# Patient Record
Sex: FEMALE | Race: WHITE | NOT HISPANIC OR LATINO
[De-identification: names, ages, dates, MRNs, and addresses within clinical notes are randomized per-mention and may not be internally consistent; named-entity substitution may affect disease eponyms.]

---

## 2019-05-02 ENCOUNTER — TRANSCRIPTION ENCOUNTER (OUTPATIENT)
Age: 49
End: 2019-05-02

## 2019-05-13 ENCOUNTER — TRANSCRIPTION ENCOUNTER (OUTPATIENT)
Age: 49
End: 2019-05-13

## 2019-07-19 ENCOUNTER — TRANSCRIPTION ENCOUNTER (OUTPATIENT)
Age: 49
End: 2019-07-19

## 2019-10-01 ENCOUNTER — TRANSCRIPTION ENCOUNTER (OUTPATIENT)
Age: 49
End: 2019-10-01

## 2019-10-17 ENCOUNTER — TRANSCRIPTION ENCOUNTER (OUTPATIENT)
Age: 49
End: 2019-10-17

## 2020-02-10 ENCOUNTER — TRANSCRIPTION ENCOUNTER (OUTPATIENT)
Age: 50
End: 2020-02-10

## 2021-10-26 ENCOUNTER — TRANSCRIPTION ENCOUNTER (OUTPATIENT)
Age: 51
End: 2021-10-26

## 2022-03-22 ENCOUNTER — NON-APPOINTMENT (OUTPATIENT)
Age: 52
End: 2022-03-22

## 2022-03-22 ENCOUNTER — APPOINTMENT (OUTPATIENT)
Dept: FAMILY MEDICINE | Facility: CLINIC | Age: 52
End: 2022-03-22
Payer: MEDICAID

## 2022-03-22 VITALS
BODY MASS INDEX: 26.66 KG/M2 | HEIGHT: 69 IN | RESPIRATION RATE: 14 BRPM | OXYGEN SATURATION: 98 % | DIASTOLIC BLOOD PRESSURE: 88 MMHG | HEART RATE: 63 BPM | WEIGHT: 180 LBS | SYSTOLIC BLOOD PRESSURE: 120 MMHG

## 2022-03-22 DIAGNOSIS — Z80.0 FAMILY HISTORY OF MALIGNANT NEOPLASM OF DIGESTIVE ORGANS: ICD-10-CM

## 2022-03-22 DIAGNOSIS — Z13.1 ENCOUNTER FOR SCREENING FOR DIABETES MELLITUS: ICD-10-CM

## 2022-03-22 DIAGNOSIS — Z83.71 FAMILY HISTORY OF COLONIC POLYPS: ICD-10-CM

## 2022-03-22 DIAGNOSIS — Z78.9 OTHER SPECIFIED HEALTH STATUS: ICD-10-CM

## 2022-03-22 DIAGNOSIS — Z13.220 ENCOUNTER FOR SCREENING FOR LIPOID DISORDERS: ICD-10-CM

## 2022-03-22 DIAGNOSIS — E61.1 IRON DEFICIENCY: ICD-10-CM

## 2022-03-22 DIAGNOSIS — D64.9 ANEMIA, UNSPECIFIED: ICD-10-CM

## 2022-03-22 DIAGNOSIS — Z13.29 ENCOUNTER FOR SCREENING FOR OTHER SUSPECTED ENDOCRINE DISORDER: ICD-10-CM

## 2022-03-22 DIAGNOSIS — Z76.89 PERSONS ENCOUNTERING HEALTH SERVICES IN OTHER SPECIFIED CIRCUMSTANCES: ICD-10-CM

## 2022-03-22 DIAGNOSIS — Z83.3 FAMILY HISTORY OF DIABETES MELLITUS: ICD-10-CM

## 2022-03-22 DIAGNOSIS — D12.6 BENIGN NEOPLASM OF COLON, UNSPECIFIED: ICD-10-CM

## 2022-03-22 DIAGNOSIS — R06.83 SNORING: ICD-10-CM

## 2022-03-22 DIAGNOSIS — H81.10 BENIGN PAROXYSMAL VERTIGO, UNSPECIFIED EAR: ICD-10-CM

## 2022-03-22 DIAGNOSIS — G47.9 SLEEP DISORDER, UNSPECIFIED: ICD-10-CM

## 2022-03-22 PROCEDURE — G0444 DEPRESSION SCREEN ANNUAL: CPT | Mod: 59

## 2022-03-22 PROCEDURE — 99205 OFFICE O/P NEW HI 60 MIN: CPT | Mod: 25

## 2022-03-22 PROCEDURE — 93000 ELECTROCARDIOGRAM COMPLETE: CPT | Mod: 59

## 2022-03-22 RX ORDER — ALBUTEROL SULFATE 90 UG/1
108 (90 BASE) INHALANT RESPIRATORY (INHALATION)
Qty: 8 | Refills: 0 | Status: DISCONTINUED | COMMUNITY
Start: 2021-12-11

## 2022-03-22 RX ORDER — CLINDAMYCIN HYDROCHLORIDE 150 MG/1
150 CAPSULE ORAL
Qty: 21 | Refills: 0 | Status: DISCONTINUED | COMMUNITY
Start: 2021-11-30

## 2022-03-22 RX ORDER — AZITHROMYCIN 250 MG/1
250 TABLET, FILM COATED ORAL
Qty: 6 | Refills: 0 | Status: DISCONTINUED | COMMUNITY
Start: 2021-12-11

## 2022-03-22 RX ORDER — METHYLPREDNISOLONE 4 MG/1
4 TABLET ORAL
Qty: 21 | Refills: 0 | Status: DISCONTINUED | COMMUNITY
Start: 2021-10-29

## 2022-03-22 RX ORDER — MONTELUKAST 10 MG/1
10 TABLET, FILM COATED ORAL
Qty: 30 | Refills: 0 | Status: DISCONTINUED | COMMUNITY
Start: 2021-10-08

## 2022-03-22 RX ORDER — TRAMADOL HYDROCHLORIDE 50 MG/1
50 TABLET, COATED ORAL
Qty: 9 | Refills: 0 | Status: DISCONTINUED | COMMUNITY
Start: 2021-11-30

## 2022-03-22 NOTE — PLAN
[FreeTextEntry1] : \par establish care\par \par Headaches worsening and more frequent and waking her out of sleep\par Neurologist consult\par Headache Diary\par tylenol prn \par MRI/MRA \par Increase fluids to 80 oz water per day\par If worst headache of life occurs, advised to go to ED, pt understood and agreed w/plan \par \par anxiety/depression \par phq9= 8 \par snoring and daytime sleepiness r/o sleep apnea\par sleep study\par MIKE 12\par Denies suicidal or homicidal ideations\par start lexapro 5mg po daily x 1 week and then increase to 10mg po daily \par -EKG- SINUS BRADYCARDIA @ 56   BPM, NORMAL AXIS, NORMAL ND/QT INTERVAL, NO ST/ T WAVE ABNORMALITIES NOTED\par EKG reviewed\par \par snoring, daytime sleepiness and moving in sleep\par sleep study \par \par possible bppv\par advised ent consult for possible epley maneuver\par \par hx anemia\par cbc iron studies and b12/folate\par \par Hx familial polyposis of colon \par advised to return to GI\par \par f/u for cpe\par given script for labs\par pt agreed w/plan \par

## 2022-03-22 NOTE — HISTORY OF PRESENT ILLNESS
[FreeTextEntry1] : patient presents for establish care and c/o migraines and sleeping issues and anxiety/depression and \par c/o dizziness when moving head side to side or looking down  [de-identified] : patient presents for establish care and c/o migraines and sleeping issues and anxiety/depression and \par c/o dizziness when moving head side to side or looking down \par \par she said "my head feels like it is going to explode" and "i had to sit in the dark."\par If she goes to [place] to visit family she has no headache but when she comes home it occurs.\par she said she is around her sisters dogs\par +headaches waking her from sleep\par she has difficulty focusing\par +blurry vision at times denies double vision\par sometimes headache is in frontal area and back of head\par she said her head feels numbness\par she has had mris in the past, last mri brain was 3-4 yrs ago\par headaches seem worse since aug-sept\par it is the same pain but stronger \par headaches are more frequent than before\par c/o  headache   pain,    4/10 constant  , alleviated by  tylenol and cbd and marijuana and goes away for 24hrs after marijuana use , aggravated by  light and sound \par \par she said she is snoring and she will have a sleep test \par \par last colonoscopy 2002\par \par Denies suicidal or homicidal ideations\par \par c/o dizziness when moving head side to side or looking down \par

## 2022-03-22 NOTE — PHYSICAL EXAM
[No Lymphadenopathy] : no lymphadenopathy [Supple] : supple [Normal] : normal rate, regular rhythm, normal S1 and S2 and no murmur heard [No Edema] : there was no peripheral edema [No Rash] : no rash [No Focal Deficits] : no focal deficits [Normal Affect] : the affect was normal [Normal Mood] : the mood was normal [Normal Insight/Judgement] : insight and judgment were intact [de-identified] : dizziness mild with extraocular movements  [de-identified] : d [de-identified] : mild dizziness with head movements lateral and flexion  [de-identified] : no calf tenderness b/l LE [de-identified] : CN 2-12 INTACT, NORMAL STRENGTH UPPER AND LOWER EXT B/L 5/5, NORMAL RAPID ALTERNATING MOVEMENTS AND FINGER TO NOSE

## 2022-03-22 NOTE — HEALTH RISK ASSESSMENT
[1] : 1) Little interest or pleasure doing things for several days (1) [2] : 2) Feeling down, depressed, or hopeless for more than half of the days (2) [PHQ-2 Positive] : PHQ-2 Positive [1/2 of Days or More (2)] : 2.) Feeling down, depressed or hopeless? Half the days or more [Nearly Every Day (3)] : 3.) Trouble falling asleep, or sleeping too much? Nearly every day [Several Days (1)] : 6.) Feeling bad about yourself, or that you are a failure, or have let yourself or your family down? Several days [Not at All (0)] : 8.) Moving or speaking so slowly that other people could have noticed, or the opposite, moving or speaking faster than usual? Not at all [Mild] : severity of depression is mild [Not at all] : How difficult have these problems made it for you to do your work, take care of things at home, or get along with people? Not at all [PHQ-9 Positive] : PHQ-9 Positive [[id number]] : 3 [[id number]] : 8

## 2022-03-22 NOTE — REVIEW OF SYSTEMS
[Headache] : headache [Dizziness] : dizziness [Anxiety] : anxiety [Depression] : depression [de-identified] : +sleeping issues

## 2022-04-05 ENCOUNTER — APPOINTMENT (OUTPATIENT)
Dept: FAMILY MEDICINE | Facility: CLINIC | Age: 52
End: 2022-04-05

## 2022-04-06 ENCOUNTER — APPOINTMENT (OUTPATIENT)
Dept: FAMILY MEDICINE | Facility: CLINIC | Age: 52
End: 2022-04-06
Payer: MEDICAID

## 2022-04-06 PROCEDURE — 99441: CPT

## 2022-04-06 RX ORDER — ESCITALOPRAM OXALATE 10 MG/1
10 TABLET ORAL
Qty: 30 | Refills: 2 | Status: DISCONTINUED | COMMUNITY
Start: 2022-03-28 | End: 2022-04-06

## 2022-04-21 LAB — HBA1C MFR BLD HPLC: 6.5

## 2022-05-09 ENCOUNTER — APPOINTMENT (OUTPATIENT)
Dept: FAMILY MEDICINE | Facility: CLINIC | Age: 52
End: 2022-05-09
Payer: MEDICAID

## 2022-05-09 VITALS
DIASTOLIC BLOOD PRESSURE: 80 MMHG | BODY MASS INDEX: 25.48 KG/M2 | WEIGHT: 172 LBS | SYSTOLIC BLOOD PRESSURE: 107 MMHG | OXYGEN SATURATION: 97 % | HEART RATE: 59 BPM | HEIGHT: 69 IN | RESPIRATION RATE: 14 BRPM

## 2022-05-09 VITALS — TEMPERATURE: 97 F

## 2022-05-09 DIAGNOSIS — M79.89 OTHER SPECIFIED SOFT TISSUE DISORDERS: ICD-10-CM

## 2022-05-09 PROCEDURE — 99214 OFFICE O/P EST MOD 30 MIN: CPT

## 2022-05-09 NOTE — HISTORY OF PRESENT ILLNESS
[FreeTextEntry8] : patient c/o pain on right leg, + swollen,+ red, +burning sensation X 3 days \par \par Was recently diagnosed with diabetes and started on metformin, she increased dosage last week and now notices that her entire right leg is swollen, beat red, hot, and painful Saturday morning. The redness has resolved after icing her leg but feels it is still swollen and very painful.

## 2022-05-09 NOTE — PHYSICAL EXAM
[No Acute Distress] : no acute distress [Well Nourished] : well nourished [Well Developed] : well developed [Well-Appearing] : well-appearing [Normal Sclera/Conjunctiva] : normal sclera/conjunctiva [PERRL] : pupils equal round and reactive to light [EOMI] : extraocular movements intact [Normal Outer Ear/Nose] : the outer ears and nose were normal in appearance [Normal Oropharynx] : the oropharynx was normal [No JVD] : no jugular venous distention [No Lymphadenopathy] : no lymphadenopathy [Supple] : supple [Thyroid Normal, No Nodules] : the thyroid was normal and there were no nodules present [No Respiratory Distress] : no respiratory distress  [No Accessory Muscle Use] : no accessory muscle use [Clear to Auscultation] : lungs were clear to auscultation bilaterally [Normal Rate] : normal rate  [Regular Rhythm] : with a regular rhythm [Normal S1, S2] : normal S1 and S2 [No Murmur] : no murmur heard [No Carotid Bruits] : no carotid bruits [No Abdominal Bruit] : a ~M bruit was not heard ~T in the abdomen [No Varicosities] : no varicosities [Pedal Pulses Present] : the pedal pulses are present [No Edema] : there was no peripheral edema [No Palpable Aorta] : no palpable aorta [No Extremity Clubbing/Cyanosis] : no extremity clubbing/cyanosis [Soft] : abdomen soft [Non Tender] : non-tender [Non-distended] : non-distended [No Masses] : no abdominal mass palpated [No HSM] : no HSM [Normal Bowel Sounds] : normal bowel sounds [Normal Posterior Cervical Nodes] : no posterior cervical lymphadenopathy [Normal Anterior Cervical Nodes] : no anterior cervical lymphadenopathy [No CVA Tenderness] : no CVA  tenderness [No Spinal Tenderness] : no spinal tenderness [No Rash] : no rash [Coordination Grossly Intact] : coordination grossly intact [No Focal Deficits] : no focal deficits [Normal Gait] : normal gait [Deep Tendon Reflexes (DTR)] : deep tendon reflexes were 2+ and symmetric [Normal Affect] : the affect was normal [Normal Insight/Judgement] : insight and judgment were intact [de-identified] : RLA swelling

## 2022-05-09 NOTE — PLAN
[FreeTextEntry1] : RLE swelling with pain\par will send for doppler of LE r.o DVT\par likely bite \par redness has resolved still with pain and swelling\par will call with result

## 2022-05-11 ENCOUNTER — APPOINTMENT (OUTPATIENT)
Dept: FAMILY MEDICINE | Facility: CLINIC | Age: 52
End: 2022-05-11
Payer: MEDICAID

## 2022-05-11 VITALS
BODY MASS INDEX: 25.48 KG/M2 | WEIGHT: 172 LBS | OXYGEN SATURATION: 98 % | DIASTOLIC BLOOD PRESSURE: 80 MMHG | HEART RATE: 66 BPM | HEIGHT: 69 IN | SYSTOLIC BLOOD PRESSURE: 120 MMHG | RESPIRATION RATE: 12 BRPM

## 2022-05-11 VITALS — TEMPERATURE: 97.3 F

## 2022-05-11 DIAGNOSIS — Z00.00 ENCOUNTER FOR GENERAL ADULT MEDICAL EXAMINATION W/OUT ABNORMAL FINDINGS: ICD-10-CM

## 2022-05-11 PROCEDURE — 99214 OFFICE O/P EST MOD 30 MIN: CPT

## 2022-05-11 RX ORDER — FLUOXETINE HYDROCHLORIDE 20 MG/1
20 CAPSULE ORAL
Qty: 30 | Refills: 1 | Status: DISCONTINUED | COMMUNITY
Start: 2022-04-06 | End: 2022-05-11

## 2022-05-11 RX ORDER — METFORMIN ER 500 MG 500 MG/1
500 TABLET ORAL
Qty: 360 | Refills: 1 | Status: DISCONTINUED | COMMUNITY
Start: 2022-04-21 | End: 2022-05-11

## 2022-05-11 NOTE — PHYSICAL EXAM
[No Lymphadenopathy] : no lymphadenopathy [Supple] : supple [Normal] : normal rate, regular rhythm, normal S1 and S2 and no murmur heard [No Edema] : there was no peripheral edema [Soft] : abdomen soft [Non Tender] : non-tender [Non-distended] : non-distended [No HSM] : no HSM [No Rash] : no rash [No Focal Deficits] : no focal deficits [Normal Affect] : the affect was normal [Normal Mood] : the mood was normal [Normal Insight/Judgement] : insight and judgment were intact [de-identified] : no calf tenderness b/l LE [de-identified] : ostomy in place with tape around it, no surrounding erythema noted but hard to asses due to tape

## 2022-05-11 NOTE — HISTORY OF PRESENT ILLNESS
[FreeTextEntry1] : pt presents for med for depression/ anxiety follow up  [de-identified] : 51yo F presents for anxiety/depression and DM II and needs ostomy supplies and heavy menses \par \par she stopped the fluoxetine for 2 weeks and had stomach upset so she had to stop it\par she felt relaxed but she had nausea\par she still feels anxious\par denies hx seizures or eating disorders\par she did not like lexapro either\par \par she started metformin april 21st\par a1c 6.5 \par \par she was drinking 7 glasses of milk per night\par she cut back to 2 glasses\par she cut pasta back to once per week\par \par she used to have heavy menses and then she said it was becoming lighter in february and now she has heavy periods again but she ddint have a perido in march and april \par denies no period for 12 mos straight\par she didn’t see obgyn as of yet\par she will see obgyn for a sonogram\par she will have upper egd friday w/ Dr [facility] \par she saw neurologist Dr Zapata and put her on medrol dose pack without relief\par she has been eating healthier\par she hasn't repeated blood work as of yet\par

## 2022-05-11 NOTE — REVIEW OF SYSTEMS
[Abdominal Pain] : no abdominal pain [Nausea] : no nausea [Vomiting] : no vomiting [Anxiety] : anxiety [Depression] : depression

## 2022-05-13 ENCOUNTER — RESULT REVIEW (OUTPATIENT)
Age: 52
End: 2022-05-13

## 2022-05-17 ENCOUNTER — NON-APPOINTMENT (OUTPATIENT)
Age: 52
End: 2022-05-17

## 2022-05-17 ENCOUNTER — APPOINTMENT (OUTPATIENT)
Dept: FAMILY MEDICINE | Facility: CLINIC | Age: 52
End: 2022-05-17
Payer: MEDICAID

## 2022-05-17 VITALS
RESPIRATION RATE: 12 BRPM | BODY MASS INDEX: 25.48 KG/M2 | DIASTOLIC BLOOD PRESSURE: 80 MMHG | OXYGEN SATURATION: 98 % | HEART RATE: 70 BPM | WEIGHT: 172 LBS | SYSTOLIC BLOOD PRESSURE: 118 MMHG | HEIGHT: 69 IN

## 2022-05-17 VITALS — TEMPERATURE: 97.1 F

## 2022-05-17 DIAGNOSIS — R55 SYNCOPE AND COLLAPSE: ICD-10-CM

## 2022-05-17 PROCEDURE — 99214 OFFICE O/P EST MOD 30 MIN: CPT | Mod: 25

## 2022-05-17 PROCEDURE — 93000 ELECTROCARDIOGRAM COMPLETE: CPT

## 2022-05-17 NOTE — HISTORY OF PRESENT ILLNESS
[FreeTextEntry1] : pt presents for med follow up \par pt c/o nausea +dizzy x 4/14/22 [de-identified] : 51yo F presents for recent syncope\par she denies palpitations prior to syncope\par she said her last meal was lunch and then at 2am she woke up to go to the bathroom to urinate \par she said she was dizzy and nauseous with the medication saturday\par  she got up to go to the bathroom and then she felt dizzy and sweating, she urinated and when she got up she felt dizzy and sweaty and vomited a little but not much , denies blood \par her fiance helped her into the room \par she said it was hot that day \par she had a syncopal episode for 1 minute and her fiance brought her down to the floor and then put her in the bed so she didn’t actually fall\par she took the pill sunday and monday and now she is tolerating the medication\par

## 2022-05-17 NOTE — REVIEW OF SYSTEMS
[Fever] : no fever [Chills] : no chills [Chest Pain] : no chest pain [Shortness Of Breath] : no shortness of breath [Dizziness] : dizziness [Fainting] : fainting

## 2022-05-17 NOTE — PHYSICAL EXAM
[No Lymphadenopathy] : no lymphadenopathy [Supple] : supple [Normal] : normal rate, regular rhythm, normal S1 and S2 and no murmur heard [No Edema] : there was no peripheral edema [Normal Posterior Cervical Nodes] : no posterior cervical lymphadenopathy [Normal Anterior Cervical Nodes] : no anterior cervical lymphadenopathy [No Rash] : no rash [No Focal Deficits] : no focal deficits [Normal Affect] : the affect was normal [Normal Mood] : the mood was normal [Normal Insight/Judgement] : insight and judgment were intact [de-identified] : no calf tenderness b/l LE [de-identified] : CN 2-12 INTACT, NORMAL STRENGTH UPPER AND LOWER EXT B/L 5/5, NORMAL RAPID ALTERNATING MOVEMENTS AND FINGER TO NOSE

## 2022-05-17 NOTE — PLAN
[FreeTextEntry1] : \par \par syncope-  vasovagal? hypoglycemia? nausea due to snri causing vasovagal? dehydration? normal neuro exam\par -EKG- NSR @  66 BPM, NORMAL AXIS, NORMAL ME/QT INTERVAL, NO ST/ T WAVE CHANGES NOTED\par EKG reviewed\par cardio consult\par if no findings may need neurologist if it happens again \par advised to stay hydrated\par advised to check bgms if feeling this way and if 70 or lower check bgms, sent kit to pharmacy , and advised to drink a juice box and check again in 15mins and if low again drink another juice and call 911 or have someone take her to the ED \par advised to eat 6 small meals or 3 big meals with snacks and advised she cannot skip meals\par advised she needs to take metformin with meals and not on an empty stomach\par she is now tolerating the effexor so will leave as is\par advised if she has another syncopal episode she will need to see a neurologist as well \par \par f/u 2  weeks or sooner if worsening  pt agreed w/plan

## 2022-05-20 RX ORDER — METFORMIN ER 500 MG 500 MG/1
500 TABLET ORAL
Qty: 180 | Refills: 0 | Status: DISCONTINUED | COMMUNITY
Start: 2022-05-11 | End: 2022-05-20

## 2022-06-10 ENCOUNTER — RX RENEWAL (OUTPATIENT)
Age: 52
End: 2022-06-10

## 2022-07-08 ENCOUNTER — RX RENEWAL (OUTPATIENT)
Age: 52
End: 2022-07-08

## 2022-08-12 ENCOUNTER — NON-APPOINTMENT (OUTPATIENT)
Age: 52
End: 2022-08-12

## 2022-08-15 ENCOUNTER — RX RENEWAL (OUTPATIENT)
Age: 52
End: 2022-08-15

## 2022-08-23 ENCOUNTER — APPOINTMENT (OUTPATIENT)
Dept: FAMILY MEDICINE | Facility: CLINIC | Age: 52
End: 2022-08-23

## 2022-08-23 VITALS
WEIGHT: 179 LBS | SYSTOLIC BLOOD PRESSURE: 130 MMHG | BODY MASS INDEX: 26.51 KG/M2 | HEIGHT: 69 IN | RESPIRATION RATE: 14 BRPM | DIASTOLIC BLOOD PRESSURE: 94 MMHG | OXYGEN SATURATION: 98 % | HEART RATE: 68 BPM

## 2022-08-23 VITALS — DIASTOLIC BLOOD PRESSURE: 80 MMHG | SYSTOLIC BLOOD PRESSURE: 124 MMHG

## 2022-08-23 DIAGNOSIS — R79.9 ABNORMAL FINDING OF BLOOD CHEMISTRY, UNSPECIFIED: ICD-10-CM

## 2022-08-23 PROCEDURE — 99214 OFFICE O/P EST MOD 30 MIN: CPT

## 2022-08-23 NOTE — PHYSICAL EXAM
[No Lymphadenopathy] : no lymphadenopathy [Supple] : supple [No Edema] : there was no peripheral edema [Normal] : soft, non-tender, non-distended, no masses palpated, no HSM and normal bowel sounds [No Rash] : no rash [No Focal Deficits] : no focal deficits [Normal Affect] : the affect was normal [Normal Mood] : the mood was normal [Normal Insight/Judgement] : insight and judgment were intact [de-identified] : no calf tenderness b/l LE [de-identified] : ostomy bag in place , scars on abdomen noted

## 2022-08-23 NOTE — PLAN
[FreeTextEntry1] : \par DM II improved \par advised to see ophthalmologist and podiatrist\par a1c 6.5-->6.0\par continue januvia 50mg po daily\par \par HLD\par will start crestor 5mg po qhs if repeat bili is normal , was slightly elevated 1.4 will repeat 2-3 weeks, denies abd pain /n/v \par will start co q 10 100-200mgpo daily at that time as well \par if muscle pain occurs stop crestor and call me\par recheck lfts in 1month and then in 3mos\par \par anxiety/depression, has room for improvement\par increase venlafaxine ER from 37.5mg to 75mg po qhs, she takes at night\par \par she said she will find out if she needs script for ostomy supplies changed\par \par f/u 3mos or sooner if issues arise\par advised if patient doesn’t hear from me 1 week after testing to call office for results , patient agreed w/plan and understood.\par

## 2022-08-23 NOTE — HISTORY OF PRESENT ILLNESS
[FreeTextEntry1] : Patient presents for medication check [de-identified] : 51yo F presents for f/u for anxiety/depression and DM II\par \par Denies chest pain, palpitations, shortness of breath, muscle pain, abd pain/n/v ,  LE edema\par \par she is grieving the loss of a family member right now\par

## 2022-08-23 NOTE — REVIEW OF SYSTEMS
[Fever] : no fever [Chills] : no chills [Chest Pain] : no chest pain [Palpitations] : no palpitations [Shortness Of Breath] : no shortness of breath [Abdominal Pain] : no abdominal pain [Nausea] : no nausea [Vomiting] : no vomiting

## 2022-09-15 ENCOUNTER — NON-APPOINTMENT (OUTPATIENT)
Age: 52
End: 2022-09-15

## 2022-09-15 ENCOUNTER — APPOINTMENT (OUTPATIENT)
Dept: FAMILY MEDICINE | Facility: CLINIC | Age: 52
End: 2022-09-15

## 2022-09-15 VITALS
HEIGHT: 69 IN | RESPIRATION RATE: 14 BRPM | TEMPERATURE: 97.2 F | SYSTOLIC BLOOD PRESSURE: 110 MMHG | BODY MASS INDEX: 26.36 KG/M2 | DIASTOLIC BLOOD PRESSURE: 60 MMHG | WEIGHT: 178 LBS | OXYGEN SATURATION: 98 % | HEART RATE: 68 BPM

## 2022-09-15 PROCEDURE — 93000 ELECTROCARDIOGRAM COMPLETE: CPT

## 2022-09-15 PROCEDURE — 99214 OFFICE O/P EST MOD 30 MIN: CPT | Mod: 25

## 2022-09-15 NOTE — PHYSICAL EXAM
[No Lymphadenopathy] : no lymphadenopathy [Supple] : supple [No Edema] : there was no peripheral edema [Normal] : soft, non-tender, non-distended, no masses palpated, no HSM and normal bowel sounds [No Rash] : no rash [No Focal Deficits] : no focal deficits [Normal Affect] : the affect was normal [Normal Mood] : the mood was normal [Normal Insight/Judgement] : insight and judgment were intact [de-identified] : no calf tenderness b/l LE [de-identified] : no guarding or rigidity, +ostomy bag in place and not distended  [de-identified] : skin lesion on right face looks with a cauliflower appearance  like a wart

## 2022-09-15 NOTE — REVIEW OF SYSTEMS
[Shortness Of Breath] : shortness of breath [Headache] : headache [Fever] : no fever [Chills] : no chills [Chest Pain] : no chest pain [Palpitations] : no palpitations [Abdominal Pain] : no abdominal pain [Nausea] : no nausea [Diarrhea] : diarrhea [Vomiting] : no vomiting [Melena] : no melena

## 2022-09-15 NOTE — PLAN
[FreeTextEntry1] : \par \par HLD\par will start crestor 5mg po qhs if repeat bili is normal , was slightly elevated 1.4 will repeat 2-3 weeks, denies abd pain /n/v \par will start co q 10 100-200mgpo daily at that time as well \par recheck lfts in 1month and then in 3mos once crestor started \par \par anxiety/depression, improved \par continue venlafaxine ER  75mg po qhs, she takes at night\par \par sob on exertion \par cardio consult advised \par -EKG- NSR @  62  BPM, NORMAL AXIS, NORMAL MA/QT INTERVAL, NO ST/ T WAVE CHANGES NOTED\par EKG reviewed\par \par diarrhea/ vomiting/ fatigue r/o covid, most likely viral gastroenteritis which resolved \par covid pcr today\par increase fluids\par cmp to ensure normal electrolytes/renal/ recheck bili\par \par labs to be done at lab \par \par f/u 3mos or sooner if issues arise\par advised if patient doesn’t hear from me 1 week after testing to call office for results , patient agreed w/plan and understood.\par \par

## 2022-09-15 NOTE — HISTORY OF PRESENT ILLNESS
[FreeTextEntry8] : Patient presents with possible stomach bug last Thursday. She was in [place], went out to dinner and later that night was vomiting non stop. Her sister in-law called ambulance ands she had blood work done and blood sugar checked. She still feels a little weak and has a headache. \par \par last thursday she had diarrhea in her bag and vomiting and so she went via ambulance to the hospital \par she also had really bad leg cramps and was told she was dehydrated. \par She was given anti nausea meds and electrolytes for leg cramps and then she was discharged.\par BGM was 257 when the ambulance came but she didn’t take her meds b/c she was vomiting. \par she got home sunday from [place] sunday night.\par She has been taking it easy, drinking lots of water, feels tired and run down\par she is grieving her nephews death\par \par she was having a nasal drip since sunday\par +sob with climbing stairs but this is not new\par denies chest pain, palpitations \par she hasn’t seen a cardiologist recently, last time was 3mos ago and she did not have the sob then\par \par denies fevers,chills, body aches, cough, nasal congestion, sore throat, sob, chest pain, loss of taste or smell\par \par denies diarrhea or vomiting now\par so she is feeling better\par \par she feels mild fatigue and had a headache this morning and it resolved w/ tylenol\par denies vision changes or new headaches , headaches waking her from sleep or increased frequency or severity of headaches\par

## 2022-09-16 LAB — SARS-COV-2 N GENE NPH QL NAA+PROBE: NOT DETECTED

## 2022-10-24 ENCOUNTER — RX RENEWAL (OUTPATIENT)
Age: 52
End: 2022-10-24

## 2022-12-23 ENCOUNTER — APPOINTMENT (OUTPATIENT)
Dept: FAMILY MEDICINE | Facility: CLINIC | Age: 52
End: 2022-12-23

## 2022-12-27 ENCOUNTER — APPOINTMENT (OUTPATIENT)
Dept: FAMILY MEDICINE | Facility: CLINIC | Age: 52
End: 2022-12-27

## 2022-12-29 ENCOUNTER — APPOINTMENT (OUTPATIENT)
Dept: FAMILY MEDICINE | Facility: CLINIC | Age: 52
End: 2022-12-29

## 2022-12-29 VITALS
SYSTOLIC BLOOD PRESSURE: 124 MMHG | DIASTOLIC BLOOD PRESSURE: 80 MMHG | BODY MASS INDEX: 23.7 KG/M2 | OXYGEN SATURATION: 98 % | WEIGHT: 160 LBS | HEART RATE: 69 BPM | HEIGHT: 69 IN | RESPIRATION RATE: 12 BRPM

## 2022-12-29 VITALS — TEMPERATURE: 97.9 F

## 2022-12-29 PROCEDURE — 99214 OFFICE O/P EST MOD 30 MIN: CPT

## 2023-01-06 ENCOUNTER — APPOINTMENT (OUTPATIENT)
Dept: ORTHOPEDIC SURGERY | Facility: CLINIC | Age: 53
End: 2023-01-06
Payer: OTHER MISCELLANEOUS

## 2023-01-06 DIAGNOSIS — M76.72 PERONEAL TENDINITIS, LEFT LEG: ICD-10-CM

## 2023-01-06 DIAGNOSIS — G62.9 POLYNEUROPATHY, UNSPECIFIED: ICD-10-CM

## 2023-01-06 PROCEDURE — 99072 ADDL SUPL MATRL&STAF TM PHE: CPT

## 2023-01-06 PROCEDURE — 99213 OFFICE O/P EST LOW 20 MIN: CPT

## 2023-01-06 PROCEDURE — 73610 X-RAY EXAM OF ANKLE: CPT | Mod: LT

## 2023-01-06 NOTE — HISTORY OF PRESENT ILLNESS
[Work related] : work related [Sudden] : sudden [Household chores] : household chores [Leisure] : leisure [Work] : work [Social interactions] : social interactions [Rest] : rest [10] : 10 [9] : 9 [Burning] : burning [Dull/Aching] : dull/aching [Throbbing] : throbbing [Tingling] : tingling [Constant] : constant [Disabled] : Work status: disabled [de-identified] : Patient is here for her left ankle. Patient had left ankle arthroscopy with extensive debridement and lateral ligament reconstruction on 4/2017 and 5/24/2019. Patient states NNI. Patient states she has had pain since 11/2022. Patient states she has numbness and tingling in the toes (foot is not on WC case) patient states she has burning and numbness in the ankle along the incision on the lateral aspect of the ankle. Patient denies PMHx of back conditions. Patient reports that 4/21/22 she was diagnosed with DM. [] : Post Surgical Visit: no [FreeTextEntry1] : Left ankle  [FreeTextEntry3] : 10/6/2016 [FreeTextEntry5] : The cause of the injury was due to loading\par food/beverages on cart, slipped and fell [de-identified] : Movement

## 2023-01-06 NOTE — PHYSICAL EXAM
[NL (40)] : plantar flexion 40 degrees [NL 30)] : inversion 30 degrees [NL (20)] : eversion 20 degrees [5___] : [facility] 5[unfilled]/5 [2+] : posterior tibialis pulse: 2+ [Decreased] : saphenous nerve sensation decreased [Left] : left ankle [There are no fractures, subluxations or dislocations. No significant abnormalities are seen] : There are no fractures, subluxations or dislocations. No significant abnormalities are seen [] : no sign of infection [FreeTextEntry3] : small ecchymosis over ATFL region

## 2023-01-06 NOTE — ASSESSMENT
[FreeTextEntry1] : 53 yo female presenting with left peroneal tendinitis, neuropathy with paresthesias radiating into her left ankle and foot. X-rays negative for fractures/abnormalities\par -Advised that patient should perform HEP \par -Activities as tolerated\par -Rest, ice, compression, elevation, NSAIDs PRN for pain. \par -All questions answered\par -F/u PRN

## 2023-01-09 NOTE — PHYSICAL EXAM
[Coordination Grossly Intact] : coordination grossly intact [No Focal Deficits] : no focal deficits [Normal Gait] : normal gait [Deep Tendon Reflexes (DTR)] : deep tendon reflexes were 2+ and symmetric [Normal] : normal gait, coordination grossly intact, no focal deficits and deep tendon reflexes were 2+ and symmetric [de-identified] : left lower back pain w radiating pain down sciatic  nerve.

## 2023-01-09 NOTE — HISTORY OF PRESENT ILLNESS
[FreeTextEntry8] : pt c/o sciatica pain +trouble standing and sleeping x thanksgiving   It started in her low back and then went to her left buttocks and down her leg. She has a history of ankle issues on left and elbow injury left. she doesn’t exercise. she tried tylenol and chiropractor  ( Correction chiropractor in [place]. x 4 weeks. \par pt presents for med check  she is diabetic  and has anxiety\par

## 2023-01-09 NOTE — ASSESSMENT
[FreeTextEntry1] : pt with over 6 w  of pain to her lower back and sciatica. she has been to chiropractic and does physical directed exercises for ove 6 weeks. she tried nsaids, muscle relaxor. the back is a little better but the sciatica is not. \par try muscle relaxor , meloxicam. if no better will add gabapentin. \par check mr lumbar ro hernaited disk\par We spent sufficient time to discuss aspects of care; questions were answered  to patient's satisfaction.The diagnosis and care plan were discussed with patient in detail.  Patient test results were  reviewed and explained in full. All questions and concerns  were answered to the best of my knowledge.\par

## 2023-01-11 ENCOUNTER — APPOINTMENT (OUTPATIENT)
Dept: FAMILY MEDICINE | Facility: CLINIC | Age: 53
End: 2023-01-11
Payer: MEDICAID

## 2023-01-11 VITALS
RESPIRATION RATE: 14 BRPM | OXYGEN SATURATION: 98 % | DIASTOLIC BLOOD PRESSURE: 90 MMHG | WEIGHT: 160 LBS | HEART RATE: 95 BPM | SYSTOLIC BLOOD PRESSURE: 130 MMHG | HEIGHT: 69 IN | BODY MASS INDEX: 23.7 KG/M2

## 2023-01-11 VITALS — TEMPERATURE: 96 F

## 2023-01-11 DIAGNOSIS — K29.00 ACUTE GASTRITIS W/OUT BLEEDING: ICD-10-CM

## 2023-01-11 DIAGNOSIS — R10.819 ABDOMINAL TENDERNESS, UNSPECIFIED SITE: ICD-10-CM

## 2023-01-11 DIAGNOSIS — T88.7XXA UNSPECIFIED ADVERSE EFFECT OF DRUG OR MEDICAMENT, INITIAL ENCOUNTER: ICD-10-CM

## 2023-01-11 DIAGNOSIS — R31.9 HEMATURIA, UNSPECIFIED: ICD-10-CM

## 2023-01-11 LAB
BILIRUB UR QL STRIP: NORMAL
GLUCOSE UR-MCNC: NEGATIVE
HCG UR QL: 0.2 EU/DL
HGB UR QL STRIP.AUTO: NORMAL
KETONES UR-MCNC: NORMAL
LEUKOCYTE ESTERASE UR QL STRIP: NORMAL
NITRITE UR QL STRIP: NEGATIVE
PH UR STRIP: 6.5
PROT UR STRIP-MCNC: NORMAL
SP GR UR STRIP: 1.03

## 2023-01-11 PROCEDURE — 81003 URINALYSIS AUTO W/O SCOPE: CPT | Mod: QW

## 2023-01-11 PROCEDURE — 99214 OFFICE O/P EST MOD 30 MIN: CPT | Mod: 25

## 2023-01-11 RX ORDER — MELOXICAM 15 MG/1
15 TABLET ORAL
Qty: 30 | Refills: 3 | Status: DISCONTINUED | COMMUNITY
Start: 2022-12-29 | End: 2023-01-11

## 2023-01-11 NOTE — HISTORY OF PRESENT ILLNESS
[FreeTextEntry8] : Patient states since saturday she's been having heartburn and pain in the back in the kidney area that she hasn't been able to sleep\par Patient states shes been short of breath, loss of appetite, and exhausted from the pain\par Patient had an MRI of the lumbar spine last night at [facility]\par \par 52 female female, unknown to mx, hx of ileostomy, gastritis who presents today concerned for worsened heartburn symptoms. \par Says it started this past friday. \par Was previously seen by NP in office, diagnosed with sciatica, started on high strength meloxicam. \par Says it is a persistent burning pain. She tried multiple otc antacids w/o relief. \par She tried her sisters protonix and obtained relief from this. \par \par Also with complaint of left back pain that she was not sure was related to lumbar herniated disc or kidney. \par Had MRI done, results not yet available. \par No dysuria or hematuria noted by patient.

## 2023-01-11 NOTE — REVIEW OF SYSTEMS
[Abdominal Pain] : abdominal pain [Heartburn] : heartburn [Back Pain] : back pain [Negative] : Neurological [Nausea] : no nausea [Constipation] : no constipation [Diarrhea] : diarrhea [Vomiting] : no vomiting [Melena] : no melena

## 2023-01-11 NOTE — ASSESSMENT
[FreeTextEntry1] : Gastritis \par reviewed patient's endoscopy report from 3/2022\par noted to have gastritis at that time. \par this chronic condition is suspected now to be in exacerbation due to medication side effect from recent high strength nsaid use. \par advised stopping meloxicam. \par will treat with protonix 40mg qd \par advised if symptoms fail to improve, will need to follow back up with GI\par \par Left Flank/CV Tenderness\par complicated by hematuria noted in UA collected in office today\par unable to exclude pyelonephritis at this time\par checking urine culture and cytology\par CT Abd pelvis ordered to eval for pyelo\par will treat empirically for uti with cipro 250mg bid x 7 days \par advised to seek care if symptoms worsen or if having fevers, chills, n/v. \par \par f/u if no improvement\par labs to be done at outside lab, will notify patient of results when available and received.\par Patient agrees with plan, all further questions answered during encounter.\par

## 2023-01-11 NOTE — PHYSICAL EXAM
[No Lymphadenopathy] : no lymphadenopathy [Normal] : no respiratory distress, lungs were clear to auscultation bilaterally and no accessory muscle use [Normal Rate] : normal rate  [Regular Rhythm] : with a regular rhythm [Normal S1, S2] : normal S1 and S2 [Soft] : abdomen soft [Non-distended] : non-distended [No Spinal Tenderness] : no spinal tenderness [Grossly Normal Strength/Tone] : grossly normal strength/tone [Coordination Grossly Intact] : coordination grossly intact [No Focal Deficits] : no focal deficits [Normal Gait] : normal gait [de-identified] : epigastric tenderness w/o rebound or guarding. ileostomy bag in place.  [de-identified] : left equivocal CVA tenderness.

## 2023-01-16 DIAGNOSIS — M51.27 OTHER INTERVERTEBRAL DISC DISPLACEMENT, LUMBOSACRAL REGION: ICD-10-CM

## 2023-01-16 LAB
BACTERIA UR CULT: NORMAL
URINE CYTOLOGY: NORMAL

## 2023-01-27 ENCOUNTER — APPOINTMENT (OUTPATIENT)
Dept: FAMILY MEDICINE | Facility: CLINIC | Age: 53
End: 2023-01-27
Payer: MEDICAID

## 2023-01-27 DIAGNOSIS — R11.0 NAUSEA: ICD-10-CM

## 2023-01-27 PROCEDURE — 99202 OFFICE O/P NEW SF 15 MIN: CPT | Mod: 95

## 2023-01-27 NOTE — PLAN
[FreeTextEntry1] : Nausea/ Loose stools diarrhea\par will check for cdiff as recently on cipro\par start zofran 4mg as needed\par increase fluids and electrolyte consumption\par

## 2023-01-27 NOTE — HISTORY OF PRESENT ILLNESS
[FreeTextEntry8] : PResenting via telehealth with complaints of very liquid stool from ileostomy rather then her normal consistency. She has not been on any new medications, but has been feeling very nauseous and felt out of breath, light headed and dizzy. She has not been out in the public but is still wondering if she has a simple bug going around. She feels very tired and run down with no energy. She has lost 45 pounds in the last 4 months, she was trying to lose weight but now is much lower then she expected, her stools started becoming very loose, she was recently on antibiotic cipro for possible kidney infection for 7 days, she completed antibiotic over 2 weeks ago. She denies any blood in the stool, mucous in the stool. \par \par She eats fruits and vegetables, does not eat much eats small breakfast lunch and dinner. She does not eat fast food or anything.

## 2023-02-03 ENCOUNTER — NON-APPOINTMENT (OUTPATIENT)
Age: 53
End: 2023-02-03

## 2023-02-16 ENCOUNTER — APPOINTMENT (OUTPATIENT)
Dept: NEUROSURGERY | Facility: CLINIC | Age: 53
End: 2023-02-16
Payer: MEDICAID

## 2023-02-16 VITALS
BODY MASS INDEX: 22.22 KG/M2 | DIASTOLIC BLOOD PRESSURE: 87 MMHG | WEIGHT: 150 LBS | HEART RATE: 79 BPM | SYSTOLIC BLOOD PRESSURE: 128 MMHG | OXYGEN SATURATION: 99 % | HEIGHT: 69 IN | TEMPERATURE: 96.4 F

## 2023-02-16 PROCEDURE — 99205 OFFICE O/P NEW HI 60 MIN: CPT

## 2023-02-17 NOTE — CONSULT LETTER
[Dear  ___] : Dear  [unfilled], [Courtesy Letter:] : I had the pleasure of seeing your patient, [unfilled], in my office today. [Sincerely,] : Sincerely, [FreeTextEntry2] : Dai Su MD\par [address],\par [place] [address]\par  [FreeTextEntry1] : Mrs. Shell is a very pleasant 52-year-old female patient who was seen in our office today in regards to left-sided leg and back pain.\par \par The patient endorses a sudden onset severe radiating left-sided leg pain starting approximately 2 months ago.  The patient does not recall any specific inciting event or trauma leading up to this problem.  At the time of onset, the patient had severe pain and numbness radiating down the left side of her leg which has since resolved significantly.  The patient states that her leg has not had any pain or numbness over the last month.  The patient currently still experiences severe pain in the low back radiating into the left left buttock.  The patient's pain is constant and present even with recumbency.  The patient has attempted exercise therapy without significant relief.  The patient attempted 5 visits with a chiropractor without significant relief.  The patient denies any new bowel or bladder symptoms.  The patient denies any weakness.\par \par The patient's past medical history is significant for diabetes, depression, and anxiety.  The patient endorses allergies to aspirin, penicillin, and metformin.  These medications cause nausea and vomiting as well as pruritus.  The patient's current medical regimen includes Januvia, venlafaxine, pantoprazole, and Tylenol.  The patient has a significant GI history with a recurrent tumor in the abdomen and multiple polyps requiring surgery.  The patient has an ostomy at this time.\par \par On examination, the patient is alert, oriented, and compliant with the exam.  The patient demonstrates full strength in the upper and lower extremities bilaterally.  The patient does not endorse point tenderness in the lumbar spine.  The patient demonstrates 2+ reflexes in the lower extremities bilaterally.  The patient has difficulty transitioning from a seated to standing position secondary to pain.  The patient ambulates with an antalgic gait.\par \par The patient is accompanied with an MRI scan of the lumbar spine dated January 10, 2023.  These images demonstrate a fairly sizable disc herniation at L5/S1 causing nerve root compression on the left. The patient also has a CT scan of the abdomen and pelvis performed on January 19, 2023 which demonstrates acute pancreatitis and pneumobilia.  The patient also has an ostomy.  The L5/S1 disc can be noted on these images.\par \par Taken together, the patient has a clinical history and radiographic findings most consistent with a lumbar radiculopathy that is partially resolved.  Although the majority of the patient's radiating leg pain has resolved, the patient continues to experience severe pain approximately in the buttock.  At this time, I have explained to the patient that she is a candidate for surgical intervention in the form of a microdiscectomy depending on her pain.  The patient has been informed that this is a quality-of-life procedure.  The potential risks and benefits of the procedure were explained in detail to the patient and the patient would like to return home to discuss her options with her friends and family.  Other conservative therapies were also discussed with the patient including physical therapy, massage therapy, medications, and injection therapy. [FreeTextEntry3] : Bud Irizarry MD, PhD, CS, FAANS Attending Neurosurgeon  of Neurosurgery [facility] at [facility] at [address], [place] [address] Office: [phone] Fax: [phone]

## 2023-03-02 ENCOUNTER — APPOINTMENT (OUTPATIENT)
Dept: FAMILY MEDICINE | Facility: CLINIC | Age: 53
End: 2023-03-02
Payer: MEDICAID

## 2023-03-02 VITALS
RESPIRATION RATE: 12 BRPM | BODY MASS INDEX: 22.66 KG/M2 | SYSTOLIC BLOOD PRESSURE: 120 MMHG | HEART RATE: 59 BPM | WEIGHT: 153 LBS | OXYGEN SATURATION: 98 % | HEIGHT: 69 IN | DIASTOLIC BLOOD PRESSURE: 80 MMHG

## 2023-03-02 VITALS — TEMPERATURE: 95.9 F

## 2023-03-02 DIAGNOSIS — R63.4 ABNORMAL WEIGHT LOSS: ICD-10-CM

## 2023-03-02 DIAGNOSIS — M54.30 SCIATICA, UNSPECIFIED SIDE: ICD-10-CM

## 2023-03-02 DIAGNOSIS — R19.7 DIARRHEA, UNSPECIFIED: ICD-10-CM

## 2023-03-02 DIAGNOSIS — R51.9 HEADACHE, UNSPECIFIED: ICD-10-CM

## 2023-03-02 PROCEDURE — 99214 OFFICE O/P EST MOD 30 MIN: CPT

## 2023-03-02 RX ORDER — CYCLOBENZAPRINE HYDROCHLORIDE 10 MG/1
10 TABLET, FILM COATED ORAL
Qty: 30 | Refills: 0 | Status: COMPLETED | COMMUNITY
Start: 2022-12-29 | End: 2023-03-02

## 2023-03-02 RX ORDER — ONDANSETRON 4 MG/1
4 TABLET ORAL EVERY 8 HOURS
Qty: 48 | Refills: 0 | Status: COMPLETED | COMMUNITY
Start: 2023-01-27 | End: 2023-03-02

## 2023-03-02 RX ORDER — CIPROFLOXACIN HYDROCHLORIDE 250 MG/1
250 TABLET, FILM COATED ORAL
Qty: 14 | Refills: 0 | Status: COMPLETED | COMMUNITY
Start: 2023-01-11 | End: 2023-03-02

## 2023-03-02 NOTE — ASSESSMENT
[FreeTextEntry1] : 52 yo wf here for follow \par hx pancreatitis\par reports 40 pound weight loss\par  \par follow up gastro \par she also reports headaches. follow up neuro   \par \par she reports depression .Increase fenlafaxine to 150 mg\par \par Labs to be drawn/ specimens obtained  at outside  lab    for evaluation of   assessed conditions - cbc cmp lipid    hgba1c for physical and screening purposes.\par  Normal

## 2023-03-02 NOTE — HISTORY OF PRESENT ILLNESS
[FreeTextEntry1] : pt presents for follow up \par pt presents for mirage follow up \par pt c/o loss of apatite x 1 month  [de-identified] : 52 yo wf here for follow up diabetes. she is dealing for migraines.\par over the course of 4 m she lost 40 pounds  she is losing too much. she has no appetite. she is not a big eater but now she picks like a bird. she lost  her nephew last august it is very hard on her  she could not see him for 5 y before\par her fiancee lost his alejandro he knew since 8 yo he had pancreatic and colon ca. I am trying to move and I am trying to save. i cant work bc I have the elbow issue\par I also went to see Dr Yin for my spine  I am going back this thursday to review the options. He said the wall of the disc broke and he has to repair the wall and the options are pain mgt or surgery.

## 2023-03-02 NOTE — REVIEW OF SYSTEMS
[Recent Change In Weight] : ~T recent weight change [Joint Pain] : joint pain [Joint Stiffness] : joint stiffness [Joint Swelling] : joint swelling [Muscle Weakness] : muscle weakness [Headache] : headache [FreeTextEntry2] : -56 [FreeTextEntry7] : ileostomy

## 2023-03-06 LAB
A1CG - A1C WITH ESTIMATED AVERAGE GLUCOSE: 6.5
ESTIMATED AVERAGE GLUCOSE: 140

## 2023-03-09 ENCOUNTER — APPOINTMENT (OUTPATIENT)
Dept: NEUROSURGERY | Facility: CLINIC | Age: 53
End: 2023-03-09
Payer: MEDICAID

## 2023-03-09 VITALS
DIASTOLIC BLOOD PRESSURE: 85 MMHG | TEMPERATURE: 98.1 F | SYSTOLIC BLOOD PRESSURE: 124 MMHG | HEIGHT: 69 IN | HEART RATE: 80 BPM | OXYGEN SATURATION: 98 %

## 2023-03-09 PROCEDURE — 99215 OFFICE O/P EST HI 40 MIN: CPT

## 2023-03-13 NOTE — CONSULT LETTER
[Dear  ___] : Dear  [unfilled], [Courtesy Letter:] : I had the pleasure of seeing your patient, [unfilled], in my office today. [Sincerely,] : Sincerely, [FreeTextEntry2] : Dai Su  [facility] [address], [place] [address]  [FreeTextEntry1] : Ms. Shell is a very pleasant 53-year-old female patient who was seen in our office today in follow-up.  The patient was previously seen in regards to a lumbar disc herniation causing left-sided low back and leg pain.\par \par At this time, the patient continues to experience severe left-sided low back pain radiating into the leg with prolonged standing.  The patient's pain has not changed significantly since her last visit.  The patient's pain is fairly constant in the low back but is worse with standing.  The patient returns today to review her surgical options with her sister and her fiancé present.\par \par On examination, the patient remains alert, oriented, and compliant with the exam.  The patient continues to demonstrate full strength of the lower extremities bilaterally.  The patient denies any point tenderness in the lower back.  The patient continues to demonstrate 2+ reflexes in the lower extremities bilaterally.  The patient ambulates with an antalgic gait.\par \par The patient is accompanied with an MRI scan of the lumbar spine dated January 10, 2023 which was previously reviewed with the patient.  The patient has a sizable left-sided disc herniation at L5/S1 likely causing nerve root compression on the traversing S1 nerve root.\par \par Taken together, the patient has a clinical history and radiographic findings most consistent with a persistent lumbar radiculopathy secondary to a sizable disc herniation at L4-5/S1.  At this time, I have reiterated to the patient that she remains a surgical candidate for a microdiscectomy at L5/S1 on the left.  The potential risks and benefits of the procedure were explained in detail to the patient and her family at this visit.  The patient has made a decision to pursue surgical intervention and we will endeavor to obtain a surgical date at the patient's earliest convenience.  Again, special precautions will need to be taken because of the patient's ostomy to prevent prolonged pressure in this region.  The patient has also requested to keep her underwear on during surgery which I believe is a reasonable request that we can accommodate. [FreeTextEntry3] : Bud Irizarry MD, PhD, CS, FAANS Attending Neurosurgeon  of Neurosurgery [facility] at [facility] at [address], [place] [address] Office: [phone] Fax: [phone]

## 2023-03-24 ENCOUNTER — RESULT REVIEW (OUTPATIENT)
Age: 53
End: 2023-03-24

## 2023-03-24 ENCOUNTER — OUTPATIENT (OUTPATIENT)
Dept: OUTPATIENT SERVICES | Facility: HOSPITAL | Age: 53
LOS: 1 days | End: 2023-03-24
Payer: MEDICAID

## 2023-03-24 VITALS
DIASTOLIC BLOOD PRESSURE: 86 MMHG | SYSTOLIC BLOOD PRESSURE: 127 MMHG | OXYGEN SATURATION: 98 % | HEART RATE: 76 BPM | TEMPERATURE: 99 F | WEIGHT: 153 LBS | HEIGHT: 69 IN | RESPIRATION RATE: 16 BRPM

## 2023-03-24 DIAGNOSIS — M51.16 INTERVERTEBRAL DISC DISORDERS WITH RADICULOPATHY, LUMBAR REGION: ICD-10-CM

## 2023-03-24 DIAGNOSIS — Z98.890 OTHER SPECIFIED POSTPROCEDURAL STATES: Chronic | ICD-10-CM

## 2023-03-24 DIAGNOSIS — Z01.818 ENCOUNTER FOR OTHER PREPROCEDURAL EXAMINATION: ICD-10-CM

## 2023-03-24 DIAGNOSIS — Z93.2 ILEOSTOMY STATUS: Chronic | ICD-10-CM

## 2023-03-24 DIAGNOSIS — Z90.49 ACQUIRED ABSENCE OF OTHER SPECIFIED PARTS OF DIGESTIVE TRACT: Chronic | ICD-10-CM

## 2023-03-24 LAB
ABO RH CONFIRMATION: SIGNIFICANT CHANGE UP
ADD ON TEST-SPECIMEN IN LAB: SIGNIFICANT CHANGE UP
ALBUMIN SERPL ELPH-MCNC: 4 G/DL — SIGNIFICANT CHANGE UP (ref 3.3–5)
ALP SERPL-CCNC: 94 U/L — SIGNIFICANT CHANGE UP (ref 40–120)
ALT FLD-CCNC: 29 U/L — SIGNIFICANT CHANGE UP (ref 12–78)
AMYLASE P1 CFR SERPL: 71 U/L — SIGNIFICANT CHANGE UP (ref 25–115)
ANION GAP SERPL CALC-SCNC: 6 MMOL/L — SIGNIFICANT CHANGE UP (ref 5–17)
APPEARANCE UR: CLEAR — SIGNIFICANT CHANGE UP
APTT BLD: 30.7 SEC — SIGNIFICANT CHANGE UP (ref 27.5–35.5)
AST SERPL-CCNC: 23 U/L — SIGNIFICANT CHANGE UP (ref 15–37)
BACTERIA # UR AUTO: ABNORMAL
BASOPHILS # BLD AUTO: 0.03 K/UL — SIGNIFICANT CHANGE UP (ref 0–0.2)
BASOPHILS NFR BLD AUTO: 0.5 % — SIGNIFICANT CHANGE UP (ref 0–2)
BILIRUB SERPL-MCNC: 1.1 MG/DL — SIGNIFICANT CHANGE UP (ref 0.2–1.2)
BILIRUB UR-MCNC: NEGATIVE — SIGNIFICANT CHANGE UP
BLD GP AB SCN SERPL QL: SIGNIFICANT CHANGE UP
BUN SERPL-MCNC: 9 MG/DL — SIGNIFICANT CHANGE UP (ref 7–23)
CALCIUM SERPL-MCNC: 9.1 MG/DL — SIGNIFICANT CHANGE UP (ref 8.5–10.1)
CHLORIDE SERPL-SCNC: 104 MMOL/L — SIGNIFICANT CHANGE UP (ref 96–108)
CO2 SERPL-SCNC: 27 MMOL/L — SIGNIFICANT CHANGE UP (ref 22–31)
COD CRY URNS QL: ABNORMAL
COLOR SPEC: YELLOW — SIGNIFICANT CHANGE UP
COMMENT - URINE: SIGNIFICANT CHANGE UP
CREAT SERPL-MCNC: 0.84 MG/DL — SIGNIFICANT CHANGE UP (ref 0.5–1.3)
DIFF PNL FLD: ABNORMAL
EGFR: 84 ML/MIN/1.73M2 — SIGNIFICANT CHANGE UP
EOSINOPHIL # BLD AUTO: 0.11 K/UL — SIGNIFICANT CHANGE UP (ref 0–0.5)
EOSINOPHIL NFR BLD AUTO: 1.7 % — SIGNIFICANT CHANGE UP (ref 0–6)
EPI CELLS # UR: SIGNIFICANT CHANGE UP
GLUCOSE SERPL-MCNC: 87 MG/DL — SIGNIFICANT CHANGE UP (ref 70–99)
GLUCOSE UR QL: NEGATIVE — SIGNIFICANT CHANGE UP
HCT VFR BLD CALC: 45.5 % — HIGH (ref 34.5–45)
HGB BLD-MCNC: 14.6 G/DL — SIGNIFICANT CHANGE UP (ref 11.5–15.5)
IMM GRANULOCYTES NFR BLD AUTO: 0.2 % — SIGNIFICANT CHANGE UP (ref 0–0.9)
INR BLD: 0.92 RATIO — SIGNIFICANT CHANGE UP (ref 0.88–1.16)
KETONES UR-MCNC: ABNORMAL
LEUKOCYTE ESTERASE UR-ACNC: ABNORMAL
LIDOCAIN IGE QN: 33 U/L — LOW (ref 73–393)
LYMPHOCYTES # BLD AUTO: 2.09 K/UL — SIGNIFICANT CHANGE UP (ref 1–3.3)
LYMPHOCYTES # BLD AUTO: 33 % — SIGNIFICANT CHANGE UP (ref 13–44)
MCHC RBC-ENTMCNC: 29.9 PG — SIGNIFICANT CHANGE UP (ref 27–34)
MCHC RBC-ENTMCNC: 32.1 GM/DL — SIGNIFICANT CHANGE UP (ref 32–36)
MCV RBC AUTO: 93 FL — SIGNIFICANT CHANGE UP (ref 80–100)
MONOCYTES # BLD AUTO: 0.36 K/UL — SIGNIFICANT CHANGE UP (ref 0–0.9)
MONOCYTES NFR BLD AUTO: 5.7 % — SIGNIFICANT CHANGE UP (ref 2–14)
NEUTROPHILS # BLD AUTO: 3.74 K/UL — SIGNIFICANT CHANGE UP (ref 1.8–7.4)
NEUTROPHILS NFR BLD AUTO: 58.9 % — SIGNIFICANT CHANGE UP (ref 43–77)
NITRITE UR-MCNC: NEGATIVE — SIGNIFICANT CHANGE UP
PH UR: 5 — SIGNIFICANT CHANGE UP (ref 5–8)
PLATELET # BLD AUTO: 291 K/UL — SIGNIFICANT CHANGE UP (ref 150–400)
POTASSIUM SERPL-MCNC: 3.3 MMOL/L — LOW (ref 3.5–5.3)
POTASSIUM SERPL-SCNC: 3.3 MMOL/L — LOW (ref 3.5–5.3)
PROT SERPL-MCNC: 7.9 GM/DL — SIGNIFICANT CHANGE UP (ref 6–8.3)
PROT UR-MCNC: NEGATIVE — SIGNIFICANT CHANGE UP
PROTHROM AB SERPL-ACNC: 10.7 SEC — SIGNIFICANT CHANGE UP (ref 10.5–13.4)
RBC # BLD: 4.89 M/UL — SIGNIFICANT CHANGE UP (ref 3.8–5.2)
RBC # FLD: 13.6 % — SIGNIFICANT CHANGE UP (ref 10.3–14.5)
RBC CASTS # UR COMP ASSIST: SIGNIFICANT CHANGE UP /HPF (ref 0–4)
SODIUM SERPL-SCNC: 137 MMOL/L — SIGNIFICANT CHANGE UP (ref 135–145)
SP GR SPEC: 1.02 — SIGNIFICANT CHANGE UP (ref 1.01–1.02)
UROBILINOGEN FLD QL: NEGATIVE — SIGNIFICANT CHANGE UP
WBC # BLD: 6.34 K/UL — SIGNIFICANT CHANGE UP (ref 3.8–10.5)
WBC # FLD AUTO: 6.34 K/UL — SIGNIFICANT CHANGE UP (ref 3.8–10.5)
WBC UR QL: SIGNIFICANT CHANGE UP /HPF (ref 0–5)

## 2023-03-24 PROCEDURE — 85025 COMPLETE CBC W/AUTO DIFF WBC: CPT

## 2023-03-24 PROCEDURE — 93010 ELECTROCARDIOGRAM REPORT: CPT

## 2023-03-24 PROCEDURE — 86901 BLOOD TYPING SEROLOGIC RH(D): CPT

## 2023-03-24 PROCEDURE — 83036 HEMOGLOBIN GLYCOSYLATED A1C: CPT

## 2023-03-24 PROCEDURE — 83690 ASSAY OF LIPASE: CPT

## 2023-03-24 PROCEDURE — 87640 STAPH A DNA AMP PROBE: CPT

## 2023-03-24 PROCEDURE — 85610 PROTHROMBIN TIME: CPT

## 2023-03-24 PROCEDURE — 86900 BLOOD TYPING SEROLOGIC ABO: CPT

## 2023-03-24 PROCEDURE — 93005 ELECTROCARDIOGRAM TRACING: CPT

## 2023-03-24 PROCEDURE — 85730 THROMBOPLASTIN TIME PARTIAL: CPT

## 2023-03-24 PROCEDURE — 99214 OFFICE O/P EST MOD 30 MIN: CPT | Mod: 25

## 2023-03-24 PROCEDURE — 82150 ASSAY OF AMYLASE: CPT

## 2023-03-24 PROCEDURE — 80053 COMPREHEN METABOLIC PANEL: CPT

## 2023-03-24 PROCEDURE — 81001 URINALYSIS AUTO W/SCOPE: CPT

## 2023-03-24 PROCEDURE — 71046 X-RAY EXAM CHEST 2 VIEWS: CPT

## 2023-03-24 PROCEDURE — 87641 MR-STAPH DNA AMP PROBE: CPT

## 2023-03-24 PROCEDURE — 36415 COLL VENOUS BLD VENIPUNCTURE: CPT

## 2023-03-24 PROCEDURE — 86850 RBC ANTIBODY SCREEN: CPT

## 2023-03-24 PROCEDURE — 71046 X-RAY EXAM CHEST 2 VIEWS: CPT | Mod: 26

## 2023-03-24 NOTE — H&P PST ADULT - NSICDXFAMILYHX_GEN_ALL_CORE_FT
FAMILY HISTORY:  Father  Still living? Unknown  FH: throat cancer, Age at diagnosis: Age Unknown    Mother  Still living? Unknown  FH: colon cancer, Age at diagnosis: Age Unknown  FH: stomach cancer, Age at diagnosis: Age Unknown    Sibling  Still living? Unknown  FH: epilepsy, Age at diagnosis: Age Unknown  FH: stomach cancer, Age at diagnosis: Age Unknown    Child  Still living? Unknown  FH: colon cancer, Age at diagnosis: Age Unknown    Grandparent  Still living? Unknown  FHx: diabetes mellitus, Age at diagnosis: Age Unknown

## 2023-03-24 NOTE — H&P PST ADULT - NSICDXPASTSURGICALHX_GEN_ALL_CORE_FT
PAST SURGICAL HISTORY:  H/O elbow surgery     H/O excision of mass     H/O shoulder surgery     History of ankle surgery     History of colon resection     Ileostomy in place

## 2023-03-24 NOTE — H&P PST ADULT - NSICDXPASTMEDICALHX_GEN_ALL_CORE_FT
PAST MEDICAL HISTORY:  Anxiety and depression     Chronic sinusitis     Colon polyp     Environmental and seasonal allergies     GERD (gastroesophageal reflux disease)     H/O renal calculi     History of left tennis elbow     Insomnia     Left rotator cuff tear     Liver tumor (benign)     Lumbar herniated disc     Lumbar radiculopathy     Migraines     Pancreatic tumor     Type 2 diabetes mellitus

## 2023-03-24 NOTE — H&P PST ADULT - HISTORY OF PRESENT ILLNESS
53 year old female diagnosed with lumbar herniated disc with radiculopathy c/o back pain radiating to left leg sharp 9/10 which started December 2022; denies left leg weakness; she presents to PST for planned L5S1 left microdiscectomy

## 2023-03-24 NOTE — H&P PST ADULT - ASSESSMENT
53 year old female diagnosed with lumbar herniated disc with radiculopathy c/o back pain radiating to left leg sharp 9/10 which started December 2022; denies left leg weakness; she presents to PST for planned L5S1 left microdiscectomy         Plan:  1. PST instructions given ; NPO status/  instructions to be given by ASU   2. Pt instructed to take following meds on day of surgery: venlafaxine protonix   3. Pt instructed to take routine evening medications unless indicated   4. Stop NSAIDS ( Aspirin Alev Motrin Mobic Diclofenac), herbal supplements , MVI , Vitamin fish oil 7 days prior to surgery  unless   directed by surgeon or cardiologist;   5. Medical Optimization  with Dr Su  6. EZ wash instructions given & mupirocin instructions given  7. Labs EKG CXR as per surgeon request   8. Pt denies covid symptoms shortness of breath fever cough   9. Spine Education Booklet given  10. Instructed pt to schedule Covid Test 3-5 days prior to surgery  53 year old female diagnosed with lumbar herniated disc with radiculopathy c/o back pain radiating to left leg sharp 9/10 which started December 2022; denies left leg weakness; she presents to PST for planned L5S1 left microdiscectomy         Plan:  1. PST instructions given ; NPO status/  instructions to be given by ASU   2. Pt instructed to take following meds on day of surgery: venlafaxine protonix   3. Pt instructed to take routine evening medications unless indicated   4. Stop NSAIDS ( Aspirin Alev Motrin Mobic Diclofenac), herbal supplements , MVI , Vitamin fish oil 7 days prior to surgery  unless   directed by surgeon or cardiologist;   5. Medical Optimization  with Dr Su  6. EZ wash instructions given & mupirocin instructions given  7. Labs EKG CXR as per surgeon request   8. Pt denies covid symptoms shortness of breath fever cough   9. Spine Education Booklet given  10. Instructed pt to schedule Covid Test 3-5 days prior to surgery   11. amylase lipase done r/o pancreatitis

## 2023-03-25 DIAGNOSIS — M51.16 INTERVERTEBRAL DISC DISORDERS WITH RADICULOPATHY, LUMBAR REGION: ICD-10-CM

## 2023-03-25 DIAGNOSIS — Z01.818 ENCOUNTER FOR OTHER PREPROCEDURAL EXAMINATION: ICD-10-CM

## 2023-03-25 LAB
A1C WITH ESTIMATED AVERAGE GLUCOSE RESULT: 6.3 % — HIGH (ref 4–5.6)
ESTIMATED AVERAGE GLUCOSE: 134 MG/DL — HIGH (ref 68–114)
MRSA PCR RESULT.: SIGNIFICANT CHANGE UP
S AUREUS DNA NOSE QL NAA+PROBE: SIGNIFICANT CHANGE UP

## 2023-03-26 ENCOUNTER — RX RENEWAL (OUTPATIENT)
Age: 53
End: 2023-03-26

## 2023-03-27 ENCOUNTER — RX RENEWAL (OUTPATIENT)
Age: 53
End: 2023-03-27

## 2023-03-28 ENCOUNTER — APPOINTMENT (OUTPATIENT)
Dept: FAMILY MEDICINE | Facility: CLINIC | Age: 53
End: 2023-03-28
Payer: MEDICAID

## 2023-03-28 VITALS
SYSTOLIC BLOOD PRESSURE: 124 MMHG | OXYGEN SATURATION: 98 % | HEIGHT: 69 IN | RESPIRATION RATE: 14 BRPM | DIASTOLIC BLOOD PRESSURE: 82 MMHG | TEMPERATURE: 97.8 F | WEIGHT: 146 LBS | BODY MASS INDEX: 21.62 KG/M2 | HEART RATE: 71 BPM

## 2023-03-28 DIAGNOSIS — K21.9 GASTRO-ESOPHAGEAL REFLUX DISEASE W/OUT ESOPHAGITIS: Chronic | ICD-10-CM

## 2023-03-28 DIAGNOSIS — Z01.818 ENCOUNTER FOR OTHER PREPROCEDURAL EXAMINATION: ICD-10-CM

## 2023-03-28 DIAGNOSIS — E87.6 HYPOKALEMIA: ICD-10-CM

## 2023-03-28 DIAGNOSIS — M47.819 SPONDYLOSIS W/OUT MYELOPATHY OR RADICULOPATHY, SITE UNSPECIFIED: Chronic | ICD-10-CM

## 2023-03-28 DIAGNOSIS — M51.16 INTERVERTEBRAL DISC DISORDERS WITH RADICULOPATHY, LUMBAR REGION: ICD-10-CM

## 2023-03-28 LAB — HBA1C MFR BLD HPLC: 6.3

## 2023-03-28 PROCEDURE — 99214 OFFICE O/P EST MOD 30 MIN: CPT

## 2023-03-28 NOTE — PLAN
[FreeTextEntry1] : Lumbar Disc Herniation with Radiculopathy\par hx of Facet Arthropathy\par chronic condition with progression in back pain symptoms now requiring surgical intervention\par PST note and labs from 3/24/23 noted in [facility] E[facility], cbc, cmp, chest xray reviewed. MRSA noted negative \par covid pcr ordered today\par EKG from 3.24.23 noted and documented in note. \par Reviewed NeuroSx note from 3/9/23\par \par Hypokalemia\par noted in cmp from pst at 3.3\par Sent KCL 20meq x 2 tabs to pharmacy\par \par Gerd\par chronic, stable\par c/w PPI\par \par patient is medically optimized for planned spinal procedure\par Patient agrees with plan, all further questions answered during encounter.\par

## 2023-03-28 NOTE — RESULTS/DATA
[] : results reviewed [Normal] : The 12 - lead ECG is normal [NSR] : normal sinus rhythm [Ventricular Rate___] : ventricular rate is [unfilled] beats per minute [P Waves Normal] : the P wave is normal [ECG Intervals WA.] : WA interval is normal [Normal QRS] : the QRS is normal [Normal ST Segments] : the ST segments are normal [Unavailable] : no prior ECGs were available for comparison [de-identified] : MRSA negative \par ABO: a positive

## 2023-03-28 NOTE — PHYSICAL EXAM
[No Lymphadenopathy] : no lymphadenopathy [Normal] : no respiratory distress, lungs were clear to auscultation bilaterally and no accessory muscle use [Normal Rate] : normal rate  [Regular Rhythm] : with a regular rhythm [Normal S1, S2] : normal S1 and S2 [No Edema] : there was no peripheral edema [Soft] : abdomen soft [Non Tender] : non-tender [Non-distended] : non-distended [Normal Bowel Sounds] : normal bowel sounds [Grossly Normal Strength/Tone] : grossly normal strength/tone [Coordination Grossly Intact] : coordination grossly intact [No Focal Deficits] : no focal deficits [Normal Gait] : normal gait [de-identified] : lumbar spinal point tenderness.

## 2023-03-28 NOTE — ASSESSMENT
[Modify medications prior to procedure] : Modify medications prior to procedure [As per surgery] : as per surgery [High Risk Surgery - Intraperitoneal, Intrathoracic or Supringuinal Vascular Procedures] : High Risk Surgery - Intraperitoneal, Intrathoracic or Supringuinal Vascular Procedures - No (0) [Ischemic Heart Disease] : Ischemic Heart Disease - No (0) [Congestive Heart Failure] : Congestive Heart Failure - No (0) [Prior Cerebrovascular Accident or TIA] : Prior Cerebrovascular Accident or TIA - No (0) [Creatinine >= 2mg/dL (1 Point)] : Creatinine >= 2mg/dL - No (0) [Insulin-dependent Diabetic (1 Point)] : Insulin-dependent Diabetic - No (0) [0] : 0 , RCRI Class: I, Risk of Post-Op Cardiac Complications: 3.9%, 95% CI for Risk Estimate: 2.8% - 5.4% [Patient Optimized for Surgery] : Patient optimized for surgery [No Further Testing Recommended] : no further testing recommended [FreeTextEntry7] : hold januvia and effexor on day of surgery.

## 2023-03-28 NOTE — REVIEW OF SYSTEMS
[Joint Pain] : joint pain [Back Pain] : back pain [Negative] : Neurological [Joint Stiffness] : no joint stiffness [Joint Swelling] : no joint swelling [Muscle Weakness] : no muscle weakness [Muscle Pain] : no muscle pain [FreeTextEntry9] : left elbow pain.

## 2023-03-28 NOTE — HISTORY OF PRESENT ILLNESS
[No Pertinent Cardiac History] : no history of aortic stenosis, atrial fibrillation, coronary artery disease, recent myocardial infarction, or implantable device/pacemaker [No Pertinent Pulmonary History] : no history of asthma, COPD, sleep apnea, or smoking [No Adverse Anesthesia Reaction] : no adverse anesthesia reaction in self or family member [Diabetes] : diabetes [(Patient denies any chest pain, claudication, dyspnea on exertion, orthopnea, palpitations or syncope)] : Patient denies any chest pain, claudication, dyspnea on exertion, orthopnea, palpitations or syncope [Moderate (4-6 METs)] : Moderate (4-6 METs) [Chronic Anticoagulation] : no chronic anticoagulation [Chronic Kidney Disease] : no chronic kidney disease [FreeTextEntry1] : Lumbar discectomy  [FreeTextEntry2] : 3/31/2023 [FreeTextEntry3] : Dr. Irizarry [FreeTextEntry4] : Patient presents for medical clearance and covid swab having spinal surgery at [facility] had labs & EKG done on Friday 3/24/23\par \par 54 yo female, hx of Dm2, Gerd, who presents today for pre-operative eval for planned lumbar microdiskectomy at . \par Says that she currently suffers from back pain that she is hoping to improve with the surgery. \par She had PST done at  4 days ago. \par She has no acute complaints today.  [FreeTextEntry8] : w

## 2023-03-29 LAB — SARS-COV-2 N GENE NPH QL NAA+PROBE: NOT DETECTED

## 2023-03-30 RX ORDER — SODIUM CHLORIDE 9 MG/ML
1000 INJECTION, SOLUTION INTRAVENOUS
Refills: 0 | Status: DISCONTINUED | OUTPATIENT
Start: 2023-03-31 | End: 2023-03-31

## 2023-03-30 RX ORDER — ONDANSETRON 8 MG/1
4 TABLET, FILM COATED ORAL ONCE
Refills: 0 | Status: DISCONTINUED | OUTPATIENT
Start: 2023-03-31 | End: 2023-03-31

## 2023-03-30 RX ORDER — FENTANYL CITRATE 50 UG/ML
50 INJECTION INTRAVENOUS
Refills: 0 | Status: DISCONTINUED | OUTPATIENT
Start: 2023-03-31 | End: 2023-03-31

## 2023-03-31 ENCOUNTER — TRANSCRIPTION ENCOUNTER (OUTPATIENT)
Age: 53
End: 2023-03-31

## 2023-03-31 ENCOUNTER — APPOINTMENT (OUTPATIENT)
Dept: NEUROSURGERY | Facility: HOSPITAL | Age: 53
End: 2023-03-31

## 2023-03-31 ENCOUNTER — OUTPATIENT (OUTPATIENT)
Dept: INPATIENT UNIT | Facility: HOSPITAL | Age: 53
LOS: 1 days | Discharge: HOME | End: 2023-03-31
Payer: MEDICAID

## 2023-03-31 VITALS
DIASTOLIC BLOOD PRESSURE: 93 MMHG | TEMPERATURE: 98 F | WEIGHT: 153 LBS | OXYGEN SATURATION: 100 % | RESPIRATION RATE: 16 BRPM | SYSTOLIC BLOOD PRESSURE: 136 MMHG | HEIGHT: 69 IN | HEART RATE: 58 BPM

## 2023-03-31 VITALS
SYSTOLIC BLOOD PRESSURE: 143 MMHG | OXYGEN SATURATION: 99 % | TEMPERATURE: 97 F | HEART RATE: 57 BPM | DIASTOLIC BLOOD PRESSURE: 91 MMHG | RESPIRATION RATE: 18 BRPM

## 2023-03-31 DIAGNOSIS — Z88.5 ALLERGY STATUS TO NARCOTIC AGENT: ICD-10-CM

## 2023-03-31 DIAGNOSIS — Z93.2 ILEOSTOMY STATUS: Chronic | ICD-10-CM

## 2023-03-31 DIAGNOSIS — G43.909 MIGRAINE, UNSPECIFIED, NOT INTRACTABLE, WITHOUT STATUS MIGRAINOSUS: ICD-10-CM

## 2023-03-31 DIAGNOSIS — E11.9 TYPE 2 DIABETES MELLITUS WITHOUT COMPLICATIONS: ICD-10-CM

## 2023-03-31 DIAGNOSIS — Z79.84 LONG TERM (CURRENT) USE OF ORAL HYPOGLYCEMIC DRUGS: ICD-10-CM

## 2023-03-31 DIAGNOSIS — Z88.0 ALLERGY STATUS TO PENICILLIN: ICD-10-CM

## 2023-03-31 DIAGNOSIS — Z88.6 ALLERGY STATUS TO ANALGESIC AGENT: ICD-10-CM

## 2023-03-31 DIAGNOSIS — M51.17 INTERVERTEBRAL DISC DISORDERS WITH RADICULOPATHY, LUMBOSACRAL REGION: ICD-10-CM

## 2023-03-31 DIAGNOSIS — M51.16 INTERVERTEBRAL DISC DISORDERS WITH RADICULOPATHY, LUMBAR REGION: ICD-10-CM

## 2023-03-31 DIAGNOSIS — Z98.890 OTHER SPECIFIED POSTPROCEDURAL STATES: Chronic | ICD-10-CM

## 2023-03-31 DIAGNOSIS — F41.9 ANXIETY DISORDER, UNSPECIFIED: ICD-10-CM

## 2023-03-31 DIAGNOSIS — K21.9 GASTRO-ESOPHAGEAL REFLUX DISEASE WITHOUT ESOPHAGITIS: ICD-10-CM

## 2023-03-31 DIAGNOSIS — E78.5 HYPERLIPIDEMIA, UNSPECIFIED: ICD-10-CM

## 2023-03-31 DIAGNOSIS — F32.A DEPRESSION, UNSPECIFIED: ICD-10-CM

## 2023-03-31 DIAGNOSIS — Z90.49 ACQUIRED ABSENCE OF OTHER SPECIFIED PARTS OF DIGESTIVE TRACT: Chronic | ICD-10-CM

## 2023-03-31 DIAGNOSIS — Z90.49 ACQUIRED ABSENCE OF OTHER SPECIFIED PARTS OF DIGESTIVE TRACT: ICD-10-CM

## 2023-03-31 LAB — HCG UR QL: NEGATIVE — SIGNIFICANT CHANGE UP

## 2023-03-31 PROCEDURE — 63030 LAMOT DCMPRN NRV RT 1 LMBR: CPT | Mod: LT

## 2023-03-31 PROCEDURE — 63030 LAMOT DCMPRN NRV RT 1 LMBR: CPT | Mod: AS,LT

## 2023-03-31 PROCEDURE — 81025 URINE PREGNANCY TEST: CPT

## 2023-03-31 PROCEDURE — 76000 FLUOROSCOPY <1 HR PHYS/QHP: CPT

## 2023-03-31 PROCEDURE — C9399: CPT

## 2023-03-31 PROCEDURE — C1889: CPT

## 2023-03-31 RX ORDER — VENLAFAXINE HCL 75 MG
1 CAPSULE, EXT RELEASE 24 HR ORAL
Qty: 0 | Refills: 0 | DISCHARGE

## 2023-03-31 RX ORDER — PANTOPRAZOLE SODIUM 20 MG/1
1 TABLET, DELAYED RELEASE ORAL
Qty: 0 | Refills: 0 | DISCHARGE

## 2023-03-31 RX ORDER — OXYCODONE HYDROCHLORIDE 5 MG/1
5 TABLET ORAL ONCE
Refills: 0 | Status: DISCONTINUED | OUTPATIENT
Start: 2023-03-31 | End: 2023-03-31

## 2023-03-31 RX ORDER — OXYCODONE HYDROCHLORIDE 5 MG/1
1 TABLET ORAL
Qty: 30 | Refills: 0
Start: 2023-03-31

## 2023-03-31 RX ORDER — SITAGLIPTIN 50 MG/1
1 TABLET, FILM COATED ORAL
Qty: 0 | Refills: 0 | DISCHARGE

## 2023-03-31 RX ADMIN — OXYCODONE HYDROCHLORIDE 5 MILLIGRAM(S): 5 TABLET ORAL at 10:50

## 2023-03-31 RX ADMIN — OXYCODONE HYDROCHLORIDE 5 MILLIGRAM(S): 5 TABLET ORAL at 10:30

## 2023-03-31 NOTE — ASU DISCHARGE PLAN (ADULT/PEDIATRIC) - ASU DC SPECIAL INSTRUCTIONSFT
Follow up with Dr. Irizarry in 2 weeks. Continue activity as tolerated. No bending, twisting of the back. Do not lift anything >10lbs or about a gallon jug of milk. Take pain medication as needed. Take over the counter stool softener while on pain medication.

## 2023-03-31 NOTE — ASU DISCHARGE PLAN (ADULT/PEDIATRIC) - CARE PROVIDER_API CALL
Bud Irizarry; PhD)  Neurosurgery  [address] [facility], [address]nd [place] [address]  Phone: [phone]  Fax: [phone]  Follow Up Time: 2 weeks

## 2023-04-01 ENCOUNTER — RX RENEWAL (OUTPATIENT)
Age: 53
End: 2023-04-01

## 2023-04-10 PROBLEM — F41.9 ANXIETY DISORDER, UNSPECIFIED: Chronic | Status: ACTIVE | Noted: 2023-03-24

## 2023-04-10 PROBLEM — J32.9 CHRONIC SINUSITIS, UNSPECIFIED: Chronic | Status: ACTIVE | Noted: 2023-03-24

## 2023-04-10 PROBLEM — Z87.442 PERSONAL HISTORY OF URINARY CALCULI: Chronic | Status: ACTIVE | Noted: 2023-03-24

## 2023-04-10 PROBLEM — G47.00 INSOMNIA, UNSPECIFIED: Chronic | Status: ACTIVE | Noted: 2023-03-24

## 2023-04-10 PROBLEM — J30.89 OTHER ALLERGIC RHINITIS: Chronic | Status: ACTIVE | Noted: 2023-03-24

## 2023-04-10 PROBLEM — Z87.39 PERSONAL HISTORY OF OTHER DISEASES OF THE MUSCULOSKELETAL SYSTEM AND CONNECTIVE TISSUE: Chronic | Status: ACTIVE | Noted: 2023-03-24

## 2023-04-10 PROBLEM — K21.9 GASTRO-ESOPHAGEAL REFLUX DISEASE WITHOUT ESOPHAGITIS: Chronic | Status: ACTIVE | Noted: 2023-03-24

## 2023-04-10 PROBLEM — D13.4 BENIGN NEOPLASM OF LIVER: Chronic | Status: ACTIVE | Noted: 2023-03-24

## 2023-04-10 PROBLEM — E11.9 TYPE 2 DIABETES MELLITUS WITHOUT COMPLICATIONS: Chronic | Status: ACTIVE | Noted: 2023-03-24

## 2023-04-10 PROBLEM — M51.26 OTHER INTERVERTEBRAL DISC DISPLACEMENT, LUMBAR REGION: Chronic | Status: ACTIVE | Noted: 2023-03-24

## 2023-04-10 PROBLEM — K63.5 POLYP OF COLON: Chronic | Status: ACTIVE | Noted: 2023-03-24

## 2023-04-10 PROBLEM — G43.909 MIGRAINE, UNSPECIFIED, NOT INTRACTABLE, WITHOUT STATUS MIGRAINOSUS: Chronic | Status: ACTIVE | Noted: 2023-03-24

## 2023-04-10 PROBLEM — M75.102 UNSPECIFIED ROTATOR CUFF TEAR OR RUPTURE OF LEFT SHOULDER, NOT SPECIFIED AS TRAUMATIC: Chronic | Status: ACTIVE | Noted: 2023-03-24

## 2023-04-10 PROBLEM — M54.16 RADICULOPATHY, LUMBAR REGION: Chronic | Status: ACTIVE | Noted: 2023-03-24

## 2023-04-10 PROBLEM — D49.0 NEOPLASM OF UNSPECIFIED BEHAVIOR OF DIGESTIVE SYSTEM: Chronic | Status: ACTIVE | Noted: 2023-03-24

## 2023-04-17 ENCOUNTER — APPOINTMENT (OUTPATIENT)
Dept: NEUROSURGERY | Facility: CLINIC | Age: 53
End: 2023-04-17
Payer: MEDICAID

## 2023-04-17 VITALS — HEART RATE: 67 BPM | SYSTOLIC BLOOD PRESSURE: 131 MMHG | DIASTOLIC BLOOD PRESSURE: 84 MMHG | OXYGEN SATURATION: 100 %

## 2023-04-17 PROCEDURE — 99024 POSTOP FOLLOW-UP VISIT: CPT

## 2023-04-18 NOTE — CONSULT LETTER
[Dear  ___] : Dear  [unfilled], [Courtesy Letter:] : I had the pleasure of seeing your patient, [unfilled], in my office today. [Sincerely,] : Sincerely, [FreeTextEntry2] : Dai Su  [facility] [address], [place] [address]    [FreeTextEntry1] : Ms. Shell is a Very pleasant 53-year-old female patient who was seen in our office today in follow-up after undergoing a L5/S1 microdiscectomy on the left.\par \par I am happy to report that the patient is currently doing well following her surgical intervention.  At this time, the patient continues to have intermittent pain on the left buttock which is usually worse with sitting more than 30 minutes or walking/standing more than 10 minutes.  This pain resolves with rest.  The patient does not have any radiating symptoms or numbness/tingling down the lower extremity on the left.\par \par On examination, the patient is alert, oriented, and compliant with the exam.  The patient demonstrates full strength in the lower extremities bilaterally.  The patient demonstrates 2+ reflexes in the lower extremities bilaterally.  The patient no longer ambulates with an antalgic gait.  The patient's incision is clean, dry, and intact.\par \par I have no new imaging to review today.\par \par Taken together, I am gratified to see the patient doing well following her surgical intervention.  Given the patient's current clinical trajectory, I have recommended slowly starting physical therapy and massage therapy at this time.  However, I have recommended that the patient continue to avoid heavy lifting, repetitive bending, and repetitive twisting exercises.  The patient will have follow-up with us in a few weeks to reevaluate her progress and I look forward to seeing her back at that time. [FreeTextEntry3] : Bud Irizarry MD, PhD, CS, FAANS Attending Neurosurgeon  of Neurosurgery [facility] at [facility] at [address], [place] [address] Office: [phone] Fax: [phone]

## 2023-05-08 ENCOUNTER — APPOINTMENT (OUTPATIENT)
Dept: NEUROSURGERY | Facility: CLINIC | Age: 53
End: 2023-05-08

## 2023-05-18 ENCOUNTER — APPOINTMENT (OUTPATIENT)
Dept: NEUROSURGERY | Facility: CLINIC | Age: 53
End: 2023-05-18
Payer: MEDICAID

## 2023-05-18 VITALS — SYSTOLIC BLOOD PRESSURE: 122 MMHG | DIASTOLIC BLOOD PRESSURE: 82 MMHG | HEART RATE: 79 BPM | OXYGEN SATURATION: 98 %

## 2023-05-18 PROCEDURE — 99024 POSTOP FOLLOW-UP VISIT: CPT

## 2023-05-18 NOTE — CONSULT LETTER
[Dear  ___] : Dear  [unfilled], [Courtesy Letter:] : I had the pleasure of seeing your patient, [unfilled], in my office today. [Sincerely,] : Sincerely, [FreeTextEntry2] : Dai Su  [facility] [address], [place] [address]  [FreeTextEntry1] : Ms. Shell is a very pleasant 53-year-old female patient who was seen in our office today in follow-up.  The patient underwent a microdiscectomy at L5/S1 on the left approximately 6 weeks ago.\par \par The patient is currently reporting temporary worsening of her pain in the left buttock on the left.  The patient states that she had returned to work for approximately 4 to 5 hours which made her pain significantly worse at the time.  Thankfully, this pain has subsided somewhat but the patient is still left with left-sided buttock pain similar to her preoperative state.  The patient has also been quite aggressive with physical therapy and has been exercising with physical therapy twice a week and twice a day on days when she is not at physical therapy.  The patient states that she is able to walk 1/4 mile before she experiences significant pain.  The patient is able to stand approximately 30 minutes before she experiences significant pain.\par \par On examination, the patient is alert, oriented, and compliant with the exam.  The patient demonstrates full strength in the lower extremities bilaterally.  The patient stands with a slightly stooped posture.  The patient's incision is clean, dry, and intact.  There is no tenderness around the incision site or buttock region.\par \par I have no new imaging to review today.\par \par Taken together, the patient has a clinical history and radiographic findings still consistent with musculoskeletal postoperative pain.  At this time, I have recommended that the patient continue physical therapy but perhaps the frequency of her physical therapy temporarily.  I have also provided the patient a refill on her cyclobenzaprine and have additionally offered the patient naproxen to be taken during the day as needed.  The patient has been instructed that she may gradually return to her preoperative activities but to do so in a gradual fashion.  Given the patient's residual symptoms, I have recommended follow-up with us in approximately 4 to 6 weeks to reevaluate her progress and I look forward to seeing the patient back then. \par \par  [FreeTextEntry3] : Bud Irizarry MD, PhD, CS, FAANS Attending Neurosurgeon  of Neurosurgery [facility] at [facility] at [address], [place] [address] Office: [phone] Fax: [phone]

## 2023-06-02 NOTE — PLAN
[FreeTextEntry1] : \par \par anxiety/depression \par phq9= 8  last visit \par MIKE 12 last visit \par Denies suicidal or homicidal ideations\par s/p lexapro and fluoxetine could not tolerate inez try effexor 37.5mg po daily and if needed will titrate up, will start low to try to prevent gi upset \par -EKG- SINUS BRADYCARDIA @ 56 BPM, NORMAL AXIS, NORMAL FL/QT INTERVAL, NO ST/ T WAVE ABNORMALITIES NOTED\par EKG reviewed last visit \par \par snoring, daytime sleepiness and moving in sleep\par sleep study was neg as per patient \par \par possible bppv\par advised ent consult for possible epley maneuver -seen and told to f/u and see a vestibular therapy \par \par hx anemia\par cbc iron studies and b12/folate was wnl exept retic count will repeat \par \par DM II \par continue metformin ER 500mg po bid as that is all she can tolerate due to gi upset\par a1c 3 mos\par continue healthy eating \par \par HLD\par continue healthy eating \par recheck 3mos \par if no relief will switch to wellbutrin\par \par needs ostomy supplies \par given script \par ileostomy supplies needed cookie pouch ostomy drain 2 piece 2 1/4 inch 10 per box model # 00053 \par flange 84615 cookie flange new image 2 piece flextend barrier CTF (cut to fit) 2 1/4 inch 5 in a box \par \par f/u 2-3 weeks or sooner if issues arise pt agreed w/plan \par \par  No

## 2023-06-22 ENCOUNTER — APPOINTMENT (OUTPATIENT)
Dept: FAMILY MEDICINE | Facility: CLINIC | Age: 53
End: 2023-06-22
Payer: MEDICAID

## 2023-06-22 VITALS
OXYGEN SATURATION: 97 % | WEIGHT: 149 LBS | HEART RATE: 85 BPM | TEMPERATURE: 97.9 F | DIASTOLIC BLOOD PRESSURE: 70 MMHG | BODY MASS INDEX: 22.07 KG/M2 | HEIGHT: 69 IN | SYSTOLIC BLOOD PRESSURE: 110 MMHG | RESPIRATION RATE: 14 BRPM

## 2023-06-22 DIAGNOSIS — M26.69 OTHER SPECIFIED DISORDERS OF TEMPOROMANDIBULAR JOINT: ICD-10-CM

## 2023-06-22 PROCEDURE — 99213 OFFICE O/P EST LOW 20 MIN: CPT

## 2023-06-22 NOTE — ASSESSMENT
[FreeTextEntry1] : GEORGE FIELDS is a 53 year female who presents today Jun 22, 2023 with jaw pain x 2 weeks\par \par Most Likely TMJ inflammation\par - Will rx ibuprofen 800mg TID PRN for pain\par - Recommend Botox and mouthguard to decrease pain\par - F/U if symptoms worsen or do not improve in 7-10 days\par \par lab script given to patient for routine blood work for chronic medical conditions.

## 2023-06-22 NOTE — HISTORY OF PRESENT ILLNESS
[FreeTextEntry8] : GEORGE FIELDS is a 53 year female who presents today Jun 22, 2023 for left jaw pain x 2 weeks. \par \par Left jaw pain that radiates to left ear. She has difficulty moving jaw and eating due to pain. She reports teeth grinding and jaw clicking. \par  She thinks it could be an ear infection. Patient denies any cold symptoms. \par Patient would also like blood work and med refills\par \par

## 2023-06-27 ENCOUNTER — APPOINTMENT (OUTPATIENT)
Dept: NEUROSURGERY | Facility: CLINIC | Age: 53
End: 2023-06-27
Payer: MEDICAID

## 2023-06-27 VITALS — SYSTOLIC BLOOD PRESSURE: 125 MMHG | OXYGEN SATURATION: 98 % | HEART RATE: 70 BPM | DIASTOLIC BLOOD PRESSURE: 81 MMHG

## 2023-06-27 DIAGNOSIS — M54.42 LUMBAGO WITH SCIATICA, RIGHT SIDE: ICD-10-CM

## 2023-06-27 DIAGNOSIS — G89.29 LUMBAGO WITH SCIATICA, RIGHT SIDE: ICD-10-CM

## 2023-06-27 DIAGNOSIS — Z12.11 ENCOUNTER FOR SCREENING FOR MALIGNANT NEOPLASM OF COLON: ICD-10-CM

## 2023-06-27 DIAGNOSIS — M54.41 LUMBAGO WITH SCIATICA, RIGHT SIDE: ICD-10-CM

## 2023-06-27 PROCEDURE — 99024 POSTOP FOLLOW-UP VISIT: CPT

## 2023-06-27 NOTE — CONSULT LETTER
[Dear  ___] : Dear  [unfilled], [Courtesy Letter:] : I had the pleasure of seeing your patient, [unfilled], in my office today. [Sincerely,] : Sincerely, [FreeTextEntry2] : Dai Su  [facility] [address], [place] [address]  [FreeTextEntry1] : Mrs. Shell is a very pleasant 53-year-old female patient who was seen in our office today approximately 3 months following a microdiscectomy on the left at L5/S1 for left-sided lumbar radiculopathy.\par \par I am happy to report that the patient's left-sided leg pains remain mostly resolved.  The patient occasionally gets pain in the left buttock but this is rare.  The patient's primary concern today is ongoing chronic low back pain which was present even before her surgical intervention.  However, the patient did notice significant improvement with this pain as well during her recovery phase.  The patient endorses several days and weeks where her chronic low back pain was rated at a 3/10 as opposed to a 9/10 with exacerbations.  The patient's pain appears to worsen with prolonged standing and is localized to the low back radiating into the thoracic spine.  The patient states that recumbency does help significantly with her pain.  The patient has been working with physical therapy with good results.\par \par On examination, the patient is alert, oriented, and compliant with the exam.  The patient demonstrates full strength in the upper and lower extremities bilaterally.  The patient stands with a slightly stooped posture.  The patient ambulates well.  The patient demonstrates 2+ reflexes in the lower extremities bilaterally.\par \par I have no new imaging to review today.\par \par Taken together, I am gratified to see the patient doing well following her surgical intervention.  At this time, I have recommended specific extensive exercise and postural training exercises for the patient given her description of pain.  I explained to the patient that some of these exercises may cause worsening pain temporarily but ultimately would provide relief in the long-term in most cases.  The patient has also been provided the massage therapy prescription for symptomatic relief in the interim.  At this time, the patient will be following up with us on an as-needed basis and I look forward to seeing the patient back at any time should the need arise. [FreeTextEntry3] : Bud Irizarry MD, PhD, CS, FAANS Attending Neurosurgeon  of Neurosurgery [facility] [employer] [facility] at [facility] at [address], [place] [address] Office: [phone] Fax: [phone]

## 2023-07-06 LAB — HBA1C MFR BLD HPLC: 5.9

## 2023-08-08 ENCOUNTER — APPOINTMENT (OUTPATIENT)
Dept: FAMILY MEDICINE | Facility: CLINIC | Age: 53
End: 2023-08-08
Payer: MEDICAID

## 2023-08-08 VITALS
DIASTOLIC BLOOD PRESSURE: 76 MMHG | HEIGHT: 69 IN | HEART RATE: 78 BPM | RESPIRATION RATE: 14 BRPM | BODY MASS INDEX: 22.07 KG/M2 | OXYGEN SATURATION: 99 % | SYSTOLIC BLOOD PRESSURE: 124 MMHG | TEMPERATURE: 98.1 F | WEIGHT: 149 LBS

## 2023-08-08 DIAGNOSIS — R06.02 SHORTNESS OF BREATH: ICD-10-CM

## 2023-08-08 DIAGNOSIS — M25.562 PAIN IN LEFT KNEE: ICD-10-CM

## 2023-08-08 PROCEDURE — 99214 OFFICE O/P EST MOD 30 MIN: CPT

## 2023-08-08 RX ORDER — METHYLPREDNISOLONE 4 MG/1
4 TABLET ORAL
Qty: 1 | Refills: 0 | Status: DISCONTINUED | COMMUNITY
Start: 2023-04-21 | End: 2023-08-08

## 2023-08-08 RX ORDER — POTASSIUM CHLORIDE 1500 MG/1
20 TABLET, FILM COATED, EXTENDED RELEASE ORAL
Qty: 2 | Refills: 0 | Status: DISCONTINUED | COMMUNITY
Start: 2023-03-28 | End: 2023-08-08

## 2023-08-08 NOTE — HISTORY OF PRESENT ILLNESS
[FreeTextEntry1] : 53 year old female present for her follow up for DM II .  [de-identified] : 53 year old female present for her follow up for DM II and c/o left knee pain   She is feeling well denies lightheadedness or dizziness or shaking/ sweating. She is tolerating januvia well and doesn't want to lower the dose yet due to a strong family hx of DM II in her family.   c/o left knee pain intermittent she said she has sob on exertion at times but denies chest pain or palpitations she denies sob right now she feels sob if she climbs stairs sometimes

## 2023-08-08 NOTE — PLAN
[FreeTextEntry1] :  Type 2 diabetes controlled continue januvia 50mg po daily- advised if dizzy/lightheaded to let me know she doesnt want to decrease the dose for now due to a strong family history advised if her a1c is very well controlled next time we should consider lowering it due to her recent intentional weight loss  left knee pain ortho consult xray  sob on exertion at times, denies sob right now  cardio for 2d echo and eval  f/u 3 mos or sooner if issues arise pt agreed w/plan

## 2023-08-08 NOTE — PHYSICAL EXAM
[No Lymphadenopathy] : no lymphadenopathy [Supple] : supple [Normal] : normal rate, regular rhythm, normal S1 and S2 and no murmur heard [No Edema] : there was no peripheral edema [No Focal Deficits] : no focal deficits [Normal Affect] : the affect was normal [Normal Mood] : the mood was normal [Normal Insight/Judgement] : insight and judgment were intact [de-identified] : no calf tenderness b/l LE [de-identified] : left  knee full rom with pain w/ medial and lateral rotation , no edema or erythema or warmth, neg lachman and anterior drawer

## 2023-08-08 NOTE — REVIEW OF SYSTEMS
[Chest Pain] : no chest pain [Palpitations] : no palpitations [Shortness Of Breath] : no shortness of breath [Abdominal Pain] : no abdominal pain [Dizziness] : no dizziness [Fainting] : no fainting

## 2023-08-14 ENCOUNTER — APPOINTMENT (OUTPATIENT)
Dept: NEUROSURGERY | Facility: CLINIC | Age: 53
End: 2023-08-14
Payer: MEDICAID

## 2023-08-14 PROCEDURE — 99215 OFFICE O/P EST HI 40 MIN: CPT

## 2023-08-14 NOTE — REASON FOR VISIT
[Follow-Up: _____] : a [unfilled] follow-up visit [FreeTextEntry1] : Pt following up for pain in the left side from surgery site down the leg

## 2023-08-14 NOTE — CONSULT LETTER
[FreeTextEntry1] : Trang is a very pleasant 53-year-old female patient who was seen in our office today regarding acute left sided leg pain.   Briefly, the patient underwent a microdiscectomy at L5/S1 approximately 5 months ago with good results. The patient's left sided radicular pain resolved and the patient was only experiencing occasional self limiting buttock pain on the left. The patient had returned to work though she remained careful with lifting activities as instructed. Unfortunately, approximately 3 weeks ago, the patient started experiencing left sided leg pain again. The patient's pain currently is less severe than previous and radiates primarily from the back to the lateral aspect of the left knee. The patient does not have pain radiating into the foot constantly, but occasionally feels radiating pain into the foot with prolonged standing. The patient endorses intermittent paresthesias when she experiences pain below the knee. The patient denies any new bowel or bladder symptoms. The patient denies weakness in the leg, but states that she has to sit when the pain is severe.   On examination, the patient is alert, oriented, and compliant with the exam. The patient demonstrates full 5/5/ strength in the lower extremities bilaterally with hip flexion, knee extension, ankle dorsiflexion, extension of the hallucis longus, and ankle plantarflexion. The patient demonstrates 2+ reflexes in the lower extremities bilaterally. The paitient's incision is clean, dry and intact. The patient ambulates well with a slight antalgic gait.   The patient's most recent imaging was performed on January 10, 2023 prior to her operative intervention. These images demonstrated a sizable L5/S1 disc herniation causing left sided S1 nerve root impingement.   Taken together, the patient has a clinical history most consistent with a recurrent lumbar radiculopathy. Given the patient's surgical history, a recurrent disc herniation is a strong possibility and thus updated MRI scans were ordered. Thankfully, the patient's pain does not appear as severe as previous, though it remains quite debilitating. A medrol dosepak was provided to the patient for temporary symptom relief. The patient cannot take NSAID medications because of her GI history. At this time, we will be in contact with the patient with her MRI scan results once they become available so that we can develop a more specific treatment plan going forward.

## 2023-08-17 ENCOUNTER — RX RENEWAL (OUTPATIENT)
Age: 53
End: 2023-08-17

## 2023-09-10 ENCOUNTER — RX RENEWAL (OUTPATIENT)
Age: 53
End: 2023-09-10

## 2023-10-19 ENCOUNTER — NON-APPOINTMENT (OUTPATIENT)
Age: 53
End: 2023-10-19

## 2023-11-10 ENCOUNTER — NON-APPOINTMENT (OUTPATIENT)
Age: 53
End: 2023-11-10

## 2024-01-08 ENCOUNTER — APPOINTMENT (OUTPATIENT)
Dept: NEUROSURGERY | Facility: CLINIC | Age: 54
End: 2024-01-08
Payer: MEDICAID

## 2024-01-08 VITALS
OXYGEN SATURATION: 100 % | HEART RATE: 89 BPM | WEIGHT: 161 LBS | SYSTOLIC BLOOD PRESSURE: 120 MMHG | BODY MASS INDEX: 23.85 KG/M2 | TEMPERATURE: 98.1 F | DIASTOLIC BLOOD PRESSURE: 80 MMHG | HEIGHT: 69 IN

## 2024-01-08 DIAGNOSIS — Z98.890 OTHER SPECIFIED POSTPROCEDURAL STATES: ICD-10-CM

## 2024-01-08 DIAGNOSIS — M54.16 RADICULOPATHY, LUMBAR REGION: ICD-10-CM

## 2024-01-08 PROCEDURE — 99214 OFFICE O/P EST MOD 30 MIN: CPT

## 2024-01-08 RX ORDER — CYCLOBENZAPRINE HYDROCHLORIDE 10 MG/1
10 TABLET, FILM COATED ORAL 3 TIMES DAILY
Qty: 60 | Refills: 0 | Status: COMPLETED | COMMUNITY
Start: 2023-04-17 | End: 2024-01-08

## 2024-01-08 RX ORDER — NAPROXEN 500 MG/1
500 TABLET ORAL
Qty: 60 | Refills: 0 | Status: ACTIVE | COMMUNITY
Start: 2024-01-08 | End: 1900-01-01

## 2024-01-08 NOTE — CONSULT LETTER
[FreeTextEntry1] : Ms. Shell is a very pleasant 53-year-old female patient who was seen in our office today in follow up. The patient underwent a microdiscectomy in March 2023 with good results previously.   Unfortunately, the patient has experienced a return of symptoms over the last 2 months. The patient currently experiences pain radiating from the low back down the length of her left leg with associated numbness and tingling. The patient's pain is worse with prolonged standing and sitting, but the pain is constant. The patient does not endorse any weakness. The patient does not endorse any new bowel or bladder issues. The patient has an ostomy. The patient has been working with physical therapy with good results since Aug 2023 when she experienced another episode of radicular pain which resolved spontaneously. MRI scans performed at that time did not reveal any recurrent disc. The patient is currently not on any medications for pain.  On examination, the patient is alert, oriented, and compliant with the exam. The patient demonstrates full strength in the lower extremities bilaterally. There is a component of giveway weakness secondary to pain with ankle dorsiflexion on the left. Straight leg raise on the left does not result in worsening radicular pain. The patient ambulates well.  The patient is accompanied with an MRI of the lumbar spine dated September 15, 2023. These images did not demonstrate any evidence of a recurrent disc.   Taken together, the patient['s current clinical symptoms are most consistent with a lumbosacral radiculopathy. The leading diagnosis would be a recurrent disc herniation, but non-structural inflammation of the nerve root remains a possibility. The patient has been recommended an updated MRI scan to rule out a structural cause given her significant worsening over the last 2 months. In the interim, the patient has been provided a medrol dose kirk and naproxen for symptom management. The patient has been recommended follow up with our office once her imaging results are available so that we can better direct her care.

## 2024-02-12 ENCOUNTER — APPOINTMENT (OUTPATIENT)
Dept: NEUROSURGERY | Facility: CLINIC | Age: 54
End: 2024-02-12

## 2024-02-16 ENCOUNTER — NON-APPOINTMENT (OUTPATIENT)
Age: 54
End: 2024-02-16

## 2024-03-06 ENCOUNTER — APPOINTMENT (OUTPATIENT)
Dept: FAMILY MEDICINE | Facility: CLINIC | Age: 54
End: 2024-03-06
Payer: MEDICAID

## 2024-03-06 VITALS
HEART RATE: 73 BPM | WEIGHT: 160 LBS | OXYGEN SATURATION: 99 % | RESPIRATION RATE: 14 BRPM | HEIGHT: 69 IN | BODY MASS INDEX: 23.7 KG/M2 | SYSTOLIC BLOOD PRESSURE: 122 MMHG | TEMPERATURE: 98.7 F | DIASTOLIC BLOOD PRESSURE: 80 MMHG

## 2024-03-06 DIAGNOSIS — E11.9 TYPE 2 DIABETES MELLITUS W/OUT COMPLICATIONS: ICD-10-CM

## 2024-03-06 DIAGNOSIS — E78.5 HYPERLIPIDEMIA, UNSPECIFIED: ICD-10-CM

## 2024-03-06 DIAGNOSIS — R09.81 NASAL CONGESTION: ICD-10-CM

## 2024-03-06 DIAGNOSIS — F41.9 ANXIETY DISORDER, UNSPECIFIED: ICD-10-CM

## 2024-03-06 DIAGNOSIS — F32.A ANXIETY DISORDER, UNSPECIFIED: ICD-10-CM

## 2024-03-06 LAB — HBA1C MFR BLD HPLC: 6.3

## 2024-03-06 PROCEDURE — G0444 DEPRESSION SCREEN ANNUAL: CPT | Mod: 59

## 2024-03-06 PROCEDURE — 83036 HEMOGLOBIN GLYCOSYLATED A1C: CPT | Mod: QW

## 2024-03-06 PROCEDURE — 99214 OFFICE O/P EST MOD 30 MIN: CPT | Mod: 25

## 2024-03-06 PROCEDURE — G2211 COMPLEX E/M VISIT ADD ON: CPT | Mod: NC,1L

## 2024-03-06 RX ORDER — BLOOD-GLUCOSE METER
70 EACH MISCELLANEOUS
Qty: 1 | Refills: 5 | Status: ACTIVE | COMMUNITY
Start: 2024-03-06 | End: 1900-01-01

## 2024-03-06 RX ORDER — FLASH GLUCOSE SCANNING READER
EACH MISCELLANEOUS
Qty: 1 | Refills: 3 | Status: ACTIVE | COMMUNITY
Start: 2024-03-06 | End: 1900-01-01

## 2024-03-06 RX ORDER — OXYCODONE 5 MG/1
5 TABLET ORAL
Qty: 10 | Refills: 0 | Status: DISCONTINUED | COMMUNITY
Start: 2023-04-21 | End: 2024-03-06

## 2024-03-06 RX ORDER — FLASH GLUCOSE SENSOR
KIT MISCELLANEOUS
Qty: 6 | Refills: 0 | Status: ACTIVE | COMMUNITY
Start: 2024-03-06 | End: 1900-01-01

## 2024-03-06 RX ORDER — FLUTICASONE PROPIONATE 50 UG/1
50 SPRAY, METERED NASAL TWICE DAILY
Qty: 1 | Refills: 2 | Status: ACTIVE | COMMUNITY
Start: 2024-03-06 | End: 1900-01-01

## 2024-03-06 RX ORDER — METHYLPREDNISOLONE 4 MG/1
4 TABLET ORAL
Qty: 1 | Refills: 0 | Status: DISCONTINUED | COMMUNITY
Start: 2023-08-14 | End: 2024-03-06

## 2024-03-06 RX ORDER — VENLAFAXINE HYDROCHLORIDE 150 MG/1
150 CAPSULE, EXTENDED RELEASE ORAL
Qty: 90 | Refills: 3 | Status: ACTIVE | COMMUNITY
Start: 2022-05-11 | End: 1900-01-01

## 2024-03-06 RX ORDER — PANTOPRAZOLE 40 MG/1
40 TABLET, DELAYED RELEASE ORAL
Qty: 90 | Refills: 1 | Status: ACTIVE | COMMUNITY
Start: 2023-01-11 | End: 1900-01-01

## 2024-03-06 NOTE — HEALTH RISK ASSESSMENT
[1] : 1) Little interest or pleasure doing things for several days (1) [PHQ-2 Positive] : PHQ-2 Positive [2] : 2) Feeling down, depressed, or hopeless for more than half of the days (2) [Nearly Every Day (3)] : 3.) Trouble falling asleep, or sleeping too much? Nearly every day [Several Days (1)] : 4.) Feeling tired or having little energy? Several days [1/2 of Days or More (2)] : 6.) Feeling bad about yourself, or that you are a failure, or have let yourself or your family down? Half the days or more [Not at All (0)] : 9.) Thoughts that you would be off dead or of hurting yourself in some way? Not at all [Mild] : Severity of Depression is Mild [Somewhat Difficult] : How difficult have these problems made it for you to do your work, take care of things at home, or get along with people? Somewhat difficult [I have developed a follow-up plan documented below in the note.] : I have developed a follow-up plan documented below in the note. [DQH2Epwie] : 3 [[id number]] : 9

## 2024-03-06 NOTE — PHYSICAL EXAM
[Supple] : supple [No Lymphadenopathy] : no lymphadenopathy [Normal Rate] : normal rate  [Normal S1, S2] : normal S1 and S2 [Regular Rhythm] : with a regular rhythm [Normal] : soft, non-tender, non-distended, no masses palpated, no HSM and normal bowel sounds [No Edema] : there was no peripheral edema [Normal Affect] : the affect was normal [No Focal Deficits] : no focal deficits [Normal Insight/Judgement] : insight and judgment were intact [Normal Mood] : the mood was normal [de-identified] :  no guarding or rigidity, +ostomy in place without erythema noted  [de-identified] : no calf tenderness b/l LE

## 2024-03-06 NOTE — REVIEW OF SYSTEMS
[Chest Pain] : no chest pain [Palpitations] : no palpitations [Shortness Of Breath] : no shortness of breath [Fainting] : no fainting

## 2024-03-06 NOTE — HISTORY OF PRESENT ILLNESS
[FreeTextEntry1] : patient presents for medication renewal. [de-identified] : 55yo Fpresents for DM II   Denies chest pain, palpitations, shortness of breath denies hypoglycemia symptoms her mood is stable on venlafaxine she is interested in finding a therapist she lost 10 friends/family members recently her 9yr old nephew  last year as well  she is feeling nasal congestion x 3 days

## 2024-03-06 NOTE — PLAN
[FreeTextEntry1] :  DM II continue januvia 50mg po daily, refilled a1c today is 6.3 continue healthy eating sent freestyle clint device, advised if not covered to let me know check ua /microalbumin/ cmp  HLD check lipid/cmp  anxiety/depression, grieving agreeable to behavioral consult for therapist continue venlafaxine ER 150mg po daily Denies suicidal or homicidal ideations  nasal congestion Trial of Flonase 50 mcg/ACT nasal suspension 1 spray in each nostril twice daily and azelastine 0.1% nasal solution 1 spray in each nostril twice daily resp swab   hx gastritis refilled pantoprazole 40mg daily   f/u3mos or sooner if issues arise pt agreed w/plan

## 2024-03-07 LAB
RAPID RVP RESULT: NOT DETECTED
SARS-COV-2 RNA PNL RESP NAA+PROBE: NOT DETECTED

## 2024-03-14 ENCOUNTER — TRANSCRIPTION ENCOUNTER (OUTPATIENT)
Age: 54
End: 2024-03-14

## 2024-03-29 DIAGNOSIS — R82.90 UNSPECIFIED ABNORMAL FINDINGS IN URINE: ICD-10-CM

## 2024-04-02 ENCOUNTER — APPOINTMENT (OUTPATIENT)
Dept: FAMILY MEDICINE | Facility: CLINIC | Age: 54
End: 2024-04-02
Payer: MEDICAID

## 2024-04-02 VITALS
SYSTOLIC BLOOD PRESSURE: 110 MMHG | WEIGHT: 160 LBS | HEART RATE: 72 BPM | TEMPERATURE: 97.7 F | RESPIRATION RATE: 14 BRPM | DIASTOLIC BLOOD PRESSURE: 80 MMHG | OXYGEN SATURATION: 99 % | HEIGHT: 69 IN | BODY MASS INDEX: 23.7 KG/M2

## 2024-04-02 DIAGNOSIS — R25.2 CRAMP AND SPASM: ICD-10-CM

## 2024-04-02 DIAGNOSIS — L98.9 DISORDER OF THE SKIN AND SUBCUTANEOUS TISSUE, UNSPECIFIED: ICD-10-CM

## 2024-04-02 DIAGNOSIS — R06.89 OTHER ABNORMALITIES OF BREATHING: ICD-10-CM

## 2024-04-02 PROCEDURE — G2211 COMPLEX E/M VISIT ADD ON: CPT | Mod: NC,1L

## 2024-04-02 PROCEDURE — 99214 OFFICE O/P EST MOD 30 MIN: CPT

## 2024-04-02 NOTE — HISTORY OF PRESENT ILLNESS
[FreeTextEntry8] : patient presents as wanting to go over test results, has severe leg cramps in right leg. has bump behind right ear, it did go down but wants it to be looked at.  c/o  right leg cramping , sharp  pain,  10/10 intermittent , lasts 20mins and relieved with massage and heating pad , and completely resolves, it feels numb at times as well and then it resolves aggravated by  walking, she said she cannot walk on it   denies sob or chest pain  she has had this pain in the past but it is now more severe lasts 15mins tops pain is worse at night she sometimes has pins and needles but more sharp in nature she said her toes curl up at times on their own she cannot stop it denies redness or warmth or swelling of legs or fevers, chills

## 2024-04-02 NOTE — PLAN
[FreeTextEntry1] :  right leg cramping r/o DVT venous doppler stat zp  check ck and cmp  neuro consult if studies neg r/o peripheral neuropathy w/ emg studies given referral   trace protein on ua  recheck once hydrated repeat UA   right sided abnormal breath sounds  cxr   labs reviewed with patient   f/u if no relief advised if patient doesnt hear from me 1 week after testing to call office for results , patient agreed w/plan and understood.

## 2024-04-02 NOTE — PHYSICAL EXAM
[Normal] : the outer ears and nose were normal in appearance and the oropharynx was normal [No Lymphadenopathy] : no lymphadenopathy [No Respiratory Distress] : no respiratory distress  [Supple] : supple [No Accessory Muscle Use] : no accessory muscle use [Normal Rate] : normal rate  [Regular Rhythm] : with a regular rhythm [Normal S1, S2] : normal S1 and S2 [No Edema] : there was no peripheral edema [No Focal Deficits] : no focal deficits [Normal Affect] : the affect was normal [Normal Mood] : the mood was normal [Normal Insight/Judgement] : insight and judgment were intact [de-identified] : right sided abnormal breath sounds , sounds like a whistling not wheezing [de-identified] : no calf tenderness b/l LE

## 2024-05-01 DIAGNOSIS — I77.810 THORACIC AORTIC ECTASIA: ICD-10-CM

## 2024-05-13 DIAGNOSIS — K86.89 OTHER SPECIFIED DISEASES OF PANCREAS: ICD-10-CM

## 2024-05-16 ENCOUNTER — APPOINTMENT (OUTPATIENT)
Dept: PULMONOLOGY | Facility: CLINIC | Age: 54
End: 2024-05-16
Payer: MEDICAID

## 2024-05-16 VITALS
SYSTOLIC BLOOD PRESSURE: 126 MMHG | TEMPERATURE: 98.6 F | DIASTOLIC BLOOD PRESSURE: 88 MMHG | WEIGHT: 160 LBS | OXYGEN SATURATION: 98 % | BODY MASS INDEX: 23.7 KG/M2 | HEART RATE: 64 BPM | HEIGHT: 69 IN

## 2024-05-16 DIAGNOSIS — R91.8 OTHER NONSPECIFIC ABNORMAL FINDING OF LUNG FIELD: ICD-10-CM

## 2024-05-16 DIAGNOSIS — Z12.31 ENCOUNTER FOR SCREENING MAMMOGRAM FOR MALIGNANT NEOPLASM OF BREAST: ICD-10-CM

## 2024-05-16 PROCEDURE — 94729 DIFFUSING CAPACITY: CPT

## 2024-05-16 PROCEDURE — 99204 OFFICE O/P NEW MOD 45 MIN: CPT | Mod: 25

## 2024-05-16 PROCEDURE — ZZZZZ: CPT

## 2024-05-16 PROCEDURE — 94727 GAS DIL/WSHOT DETER LNG VOL: CPT

## 2024-05-16 PROCEDURE — 94010 BREATHING CAPACITY TEST: CPT

## 2024-05-16 RX ORDER — BLOOD-GLUCOSE METER
W/DEVICE EACH MISCELLANEOUS
Qty: 1 | Refills: 3 | Status: DISCONTINUED | COMMUNITY
Start: 2022-05-17 | End: 2024-05-16

## 2024-05-16 RX ORDER — IBUPROFEN 800 MG/1
800 TABLET, FILM COATED ORAL 3 TIMES DAILY
Qty: 15 | Refills: 0 | Status: DISCONTINUED | COMMUNITY
Start: 2023-06-22 | End: 2024-05-16

## 2024-05-16 RX ORDER — BLOOD SUGAR DIAGNOSTIC
STRIP MISCELLANEOUS
Qty: 1 | Refills: 5 | Status: DISCONTINUED | COMMUNITY
Start: 2022-05-17 | End: 2024-05-16

## 2024-05-16 RX ORDER — AZELASTINE HYDROCHLORIDE 137 UG/1
0.1 SPRAY, METERED NASAL TWICE DAILY
Qty: 1 | Refills: 2 | Status: DISCONTINUED | COMMUNITY
Start: 2024-03-06 | End: 2024-05-16

## 2024-05-16 RX ORDER — BLOOD-GLUCOSE METER
70 EACH MISCELLANEOUS
Qty: 1 | Refills: 5 | Status: DISCONTINUED | COMMUNITY
Start: 2022-05-17 | End: 2024-05-16

## 2024-05-16 RX ORDER — OSTOMY SUPPLY 1 3/4"
EACH MISCELLANEOUS
Qty: 1 | Refills: 11 | Status: DISCONTINUED | OUTPATIENT
Start: 2022-05-11 | End: 2024-05-16

## 2024-05-16 NOTE — PROCEDURE
[FreeTextEntry1] : Pulmonary function test 5/16/2024 no obstructive or restrictive lung disease. CAT scan chest 4/12/2024 no suspicious nodule 2 punctate nodules right lung base no change from January 2023

## 2024-05-16 NOTE — HISTORY OF PRESENT ILLNESS
[Never] : never [Current] : current [TextBox_4] : 54 female no hx tobacco ++vape  x 1 yr  Presents today  bec of abn ct chest  pt feels good but tired  no sob no cough no wheeze no chest pain no tightness some  dyspnea on exertion  no wheeze no chest pain no tightness precipitating factors none  ++sinus congestion  no hemoptysis no weight loss  occ [employer]  [TextBox_22] : sunny [TextBox_27] : sometimes

## 2024-05-16 NOTE — DISCUSSION/SUMMARY
[FreeTextEntry1] : Ms. Shell has 2 punctate nodules unchanged in 1 year.  Although not measured punctate usually means quite small.  In a non-smoking patient which is low risk for carcinoma no further evaluation is needed.  Patient's pulmonary function tests are normal and she denies any symptoms consistent with asthma.  At this time I do not think there is any significant pulmonary disease. The patient understands and agrees with plan of care. Today's office visit encompassed 46 minutes. I conducted an extensive history,physical exam and reviewed diagnosis and treatment options including diagnostic tests,radiology studies including cat scans and the use of prescription medication.

## 2024-05-16 NOTE — REASON FOR VISIT
[Initial] : an initial visit [Abnormal CXR/ Chest CT] : an abnormal CXR/ chest CT [Cough] : cough [Shortness of Breath] : shortness of breath [TextEntry] : Patient was referred to pulmonary by PCP Dr. Dai Su. Patient has an abnormal CT scan. Patient complaints of coughing, sob when exercising herself. PHQ-9 positive provider notify.

## 2024-06-02 ENCOUNTER — RX RENEWAL (OUTPATIENT)
Age: 54
End: 2024-06-02

## 2024-07-02 ENCOUNTER — RX CHANGE (OUTPATIENT)
Age: 54
End: 2024-07-02

## 2024-08-28 ENCOUNTER — RX CHANGE (OUTPATIENT)
Age: 54
End: 2024-08-28

## 2024-11-23 ENCOUNTER — RX RENEWAL (OUTPATIENT)
Age: 54
End: 2024-11-23

## 2025-02-18 ENCOUNTER — RX RENEWAL (OUTPATIENT)
Age: 55
End: 2025-02-18

## 2025-02-20 ENCOUNTER — RX RENEWAL (OUTPATIENT)
Age: 55
End: 2025-02-20